# Patient Record
Sex: FEMALE | Race: WHITE | NOT HISPANIC OR LATINO | Employment: OTHER | URBAN - METROPOLITAN AREA
[De-identification: names, ages, dates, MRNs, and addresses within clinical notes are randomized per-mention and may not be internally consistent; named-entity substitution may affect disease eponyms.]

---

## 2022-10-13 ENCOUNTER — PATIENT OUTREACH (OUTPATIENT)
Dept: ADMINISTRATIVE | Facility: CLINIC | Age: 67
End: 2022-10-13

## 2022-10-13 NOTE — PROGRESS NOTES
C3 nurse attempted to contact Melissa Krishnamurthy for a TCC post hospital discharge follow up call. No answer. Left voicemail with callback information. The patient does not have a scheduled HOSFU appointment. Message sent to PCP staff for assistance with scheduling visit with patient.

## 2022-10-13 NOTE — PROGRESS NOTES
2nd Attempt made to reach patient for TCC call. Left voicemail please call [phone] leave first name, last name, and  for Naye I will return your call.

## 2022-10-18 NOTE — PROGRESS NOTES
C3 nurse spoke with Melissa Krishnamurthy  for a TCC post hospital discharge follow up call. The patient has completed a HOSFU appointment with Tonio Resendez MD  on 10/14/22 .

## 2022-10-18 NOTE — TELEPHONE ENCOUNTER
Eliquis 2.5 mg 1 tablet BID .  Amlodipine 200 mg once daily.  Metoprolol 25 mg tablet once daily.  Allopurinol 100 mg tablet once daily.  Bethanechol 25 mg 1 tablet BID.  Bumetanide 2 mg once daily  Duloxetine 60 mg capsule once daily.  Ferrous sulfate 322 mg tablet once daily.  K Dur 10 meq once daily.  Rosuvastatin 10 mg once daily.  Alendronate sodium 70 mg once a week.  Flonase 50 mcg nasal spray 1 spray each nostril route BID.  Spiriva Inhaler - Inhale 1 capsule once daily.  Symbicort 150/4.5 mg- Inhale 2 puffs BID

## 2024-01-01 ENCOUNTER — HOSPITAL ENCOUNTER (INPATIENT)
Facility: HOSPITAL | Age: 69
LOS: 3 days | Discharge: HOME HEALTH | DRG: 291 | End: 2024-01-05
Attending: EMERGENCY MEDICINE | Admitting: INTERNAL MEDICINE
Payer: MEDICARE

## 2024-01-01 DIAGNOSIS — R06.02 SHORTNESS OF BREATH: Primary | ICD-10-CM

## 2024-01-01 DIAGNOSIS — I48.91 ATRIAL FIBRILLATION WITH RVR: ICD-10-CM

## 2024-01-01 DIAGNOSIS — I50.9 CHF (CONGESTIVE HEART FAILURE): ICD-10-CM

## 2024-01-01 DIAGNOSIS — R79.89 ELEVATED TROPONIN: ICD-10-CM

## 2024-01-01 PROBLEM — J96.21 ACUTE ON CHRONIC HYPOXIC RESPIRATORY FAILURE: Status: ACTIVE | Noted: 2024-01-01

## 2024-01-01 PROBLEM — I34.1 MITRAL VALVE PROLAPSE: Status: ACTIVE | Noted: 2024-01-01

## 2024-01-01 PROBLEM — I50.33 ACUTE ON CHRONIC DIASTOLIC CONGESTIVE HEART FAILURE: Status: ACTIVE | Noted: 2024-01-01

## 2024-01-01 LAB
ALBUMIN SERPL BCP-MCNC: 3.2 G/DL (ref 3.5–5.2)
ALP SERPL-CCNC: 83 U/L (ref 55–135)
ALT SERPL W/O P-5'-P-CCNC: 10 U/L (ref 10–44)
ANION GAP SERPL CALC-SCNC: 13 MMOL/L (ref 8–16)
AST SERPL-CCNC: 17 U/L (ref 10–40)
BASOPHILS # BLD AUTO: 0.01 K/UL (ref 0–0.2)
BASOPHILS NFR BLD: 0.1 % (ref 0–1.9)
BILIRUB SERPL-MCNC: 0.8 MG/DL (ref 0.1–1)
BNP SERPL-MCNC: 441 PG/ML (ref 0–99)
BUN SERPL-MCNC: 20 MG/DL (ref 8–23)
CALCIUM SERPL-MCNC: 9.4 MG/DL (ref 8.7–10.5)
CHLORIDE SERPL-SCNC: 99 MMOL/L (ref 95–110)
CO2 SERPL-SCNC: 26 MMOL/L (ref 23–29)
CREAT SERPL-MCNC: 1.4 MG/DL (ref 0.5–1.4)
DIFFERENTIAL METHOD BLD: ABNORMAL
EOSINOPHIL # BLD AUTO: 0 K/UL (ref 0–0.5)
EOSINOPHIL NFR BLD: 0.4 % (ref 0–8)
ERYTHROCYTE [DISTWIDTH] IN BLOOD BY AUTOMATED COUNT: 16.8 % (ref 11.5–14.5)
EST. GFR  (NO RACE VARIABLE): 40 ML/MIN/1.73 M^2
ESTIMATED AVG GLUCOSE: 100 MG/DL (ref 68–131)
GLUCOSE SERPL-MCNC: 119 MG/DL (ref 70–110)
HBA1C MFR BLD: 5.1 % (ref 4–5.6)
HCT VFR BLD AUTO: 24.9 % (ref 37–48.5)
HGB BLD-MCNC: 8.1 G/DL (ref 12–16)
IMM GRANULOCYTES # BLD AUTO: 0.05 K/UL (ref 0–0.04)
IMM GRANULOCYTES NFR BLD AUTO: 0.5 % (ref 0–0.5)
INFLUENZA A, MOLECULAR: NEGATIVE
INFLUENZA B, MOLECULAR: NEGATIVE
LYMPHOCYTES # BLD AUTO: 0.5 K/UL (ref 1–4.8)
LYMPHOCYTES NFR BLD: 5.5 % (ref 18–48)
MAGNESIUM SERPL-MCNC: 2.2 MG/DL (ref 1.6–2.6)
MCH RBC QN AUTO: 28.1 PG (ref 27–31)
MCHC RBC AUTO-ENTMCNC: 32.5 G/DL (ref 32–36)
MCV RBC AUTO: 87 FL (ref 82–98)
MONOCYTES # BLD AUTO: 0.8 K/UL (ref 0.3–1)
MONOCYTES NFR BLD: 7.6 % (ref 4–15)
NEUTROPHILS # BLD AUTO: 8.5 K/UL (ref 1.8–7.7)
NEUTROPHILS NFR BLD: 85.9 % (ref 38–73)
NRBC BLD-RTO: 0 /100 WBC
PLATELET # BLD AUTO: 179 K/UL (ref 150–450)
PMV BLD AUTO: 10 FL (ref 9.2–12.9)
POTASSIUM SERPL-SCNC: 4.2 MMOL/L (ref 3.5–5.1)
PROT SERPL-MCNC: 6.6 G/DL (ref 6–8.4)
RBC # BLD AUTO: 2.88 M/UL (ref 4–5.4)
SARS-COV-2 RDRP RESP QL NAA+PROBE: NEGATIVE
SODIUM SERPL-SCNC: 138 MMOL/L (ref 136–145)
SPECIMEN SOURCE: NORMAL
T4 FREE SERPL-MCNC: 1.1 NG/DL (ref 0.71–1.51)
TROPONIN I SERPL DL<=0.01 NG/ML-MCNC: 0.03 NG/ML (ref 0–0.03)
TROPONIN I SERPL DL<=0.01 NG/ML-MCNC: 0.04 NG/ML (ref 0–0.03)
TROPONIN I SERPL DL<=0.01 NG/ML-MCNC: 0.04 NG/ML (ref 0–0.03)
TSH SERPL DL<=0.005 MIU/L-ACNC: 2.82 UIU/ML (ref 0.4–4)
WBC # BLD AUTO: 9.89 K/UL (ref 3.9–12.7)

## 2024-01-01 PROCEDURE — 84443 ASSAY THYROID STIM HORMONE: CPT | Performed by: INTERNAL MEDICINE

## 2024-01-01 PROCEDURE — 25000003 PHARM REV CODE 250: Performed by: INTERNAL MEDICINE

## 2024-01-01 PROCEDURE — 93010 ELECTROCARDIOGRAM REPORT: CPT | Mod: 76,,, | Performed by: INTERNAL MEDICINE

## 2024-01-01 PROCEDURE — 5A09457 ASSISTANCE WITH RESPIRATORY VENTILATION, 24-96 CONSECUTIVE HOURS, CONTINUOUS POSITIVE AIRWAY PRESSURE: ICD-10-PCS | Performed by: FAMILY MEDICINE

## 2024-01-01 PROCEDURE — 27000190 HC CPAP FULL FACE MASK W/VALVE

## 2024-01-01 PROCEDURE — 83036 HEMOGLOBIN GLYCOSYLATED A1C: CPT | Performed by: INTERNAL MEDICINE

## 2024-01-01 PROCEDURE — 93005 ELECTROCARDIOGRAM TRACING: CPT

## 2024-01-01 PROCEDURE — 83735 ASSAY OF MAGNESIUM: CPT | Performed by: INTERNAL MEDICINE

## 2024-01-01 PROCEDURE — 99900035 HC TECH TIME PER 15 MIN (STAT)

## 2024-01-01 PROCEDURE — 84484 ASSAY OF TROPONIN QUANT: CPT | Mod: 91 | Performed by: INTERNAL MEDICINE

## 2024-01-01 PROCEDURE — 27000221 HC OXYGEN, UP TO 24 HOURS

## 2024-01-01 PROCEDURE — 80053 COMPREHEN METABOLIC PANEL: CPT | Performed by: EMERGENCY MEDICINE

## 2024-01-01 PROCEDURE — G0378 HOSPITAL OBSERVATION PER HR: HCPCS

## 2024-01-01 PROCEDURE — 96374 THER/PROPH/DIAG INJ IV PUSH: CPT

## 2024-01-01 PROCEDURE — 63600175 PHARM REV CODE 636 W HCPCS: Performed by: INTERNAL MEDICINE

## 2024-01-01 PROCEDURE — 94660 CPAP INITIATION&MGMT: CPT

## 2024-01-01 PROCEDURE — 83880 ASSAY OF NATRIURETIC PEPTIDE: CPT | Performed by: EMERGENCY MEDICINE

## 2024-01-01 PROCEDURE — 96372 THER/PROPH/DIAG INJ SC/IM: CPT | Performed by: INTERNAL MEDICINE

## 2024-01-01 PROCEDURE — 85025 COMPLETE CBC W/AUTO DIFF WBC: CPT | Performed by: EMERGENCY MEDICINE

## 2024-01-01 PROCEDURE — 84439 ASSAY OF FREE THYROXINE: CPT | Performed by: INTERNAL MEDICINE

## 2024-01-01 PROCEDURE — 99291 CRITICAL CARE FIRST HOUR: CPT

## 2024-01-01 PROCEDURE — 63600175 PHARM REV CODE 636 W HCPCS: Performed by: EMERGENCY MEDICINE

## 2024-01-01 PROCEDURE — 93010 ELECTROCARDIOGRAM REPORT: CPT | Mod: ,,, | Performed by: INTERNAL MEDICINE

## 2024-01-01 PROCEDURE — 84484 ASSAY OF TROPONIN QUANT: CPT | Performed by: EMERGENCY MEDICINE

## 2024-01-01 PROCEDURE — 87502 INFLUENZA DNA AMP PROBE: CPT | Performed by: EMERGENCY MEDICINE

## 2024-01-01 PROCEDURE — 94761 N-INVAS EAR/PLS OXIMETRY MLT: CPT

## 2024-01-01 PROCEDURE — U0002 COVID-19 LAB TEST NON-CDC: HCPCS | Performed by: EMERGENCY MEDICINE

## 2024-01-01 RX ORDER — SENNOSIDES 8.6 MG/1
1 TABLET ORAL NIGHTLY
COMMUNITY
Start: 2023-11-23 | End: 2024-11-22

## 2024-01-01 RX ORDER — ALLOPURINOL 100 MG/1
100 TABLET ORAL DAILY
Status: DISCONTINUED | OUTPATIENT
Start: 2024-01-02 | End: 2024-01-05 | Stop reason: HOSPADM

## 2024-01-01 RX ORDER — AMLODIPINE BESYLATE 5 MG/1
5 TABLET ORAL 2 TIMES DAILY
Status: ON HOLD | COMMUNITY
Start: 2023-11-10 | End: 2024-01-05 | Stop reason: HOSPADM

## 2024-01-01 RX ORDER — ENOXAPARIN SODIUM 100 MG/ML
40 INJECTION SUBCUTANEOUS EVERY 24 HOURS
Status: DISCONTINUED | OUTPATIENT
Start: 2024-01-01 | End: 2024-01-02

## 2024-01-01 RX ORDER — FERROUS SULFATE 325(65) MG
325 TABLET ORAL 2 TIMES DAILY
COMMUNITY
Start: 2023-11-17

## 2024-01-01 RX ORDER — OMEPRAZOLE 20 MG/1
20 CAPSULE, DELAYED RELEASE ORAL 2 TIMES DAILY
COMMUNITY

## 2024-01-01 RX ORDER — POLYETHYLENE GLYCOL 3350 17 G/17G
17 POWDER, FOR SOLUTION ORAL DAILY
Status: DISCONTINUED | OUTPATIENT
Start: 2024-01-02 | End: 2024-01-05 | Stop reason: HOSPADM

## 2024-01-01 RX ORDER — ALLOPURINOL 100 MG/1
100 TABLET ORAL DAILY
COMMUNITY

## 2024-01-01 RX ORDER — LEVOTHYROXINE SODIUM 75 UG/1
75 TABLET ORAL EVERY MORNING
COMMUNITY
Start: 2023-11-23 | End: 2024-11-22

## 2024-01-01 RX ORDER — POLYETHYLENE GLYCOL 3350 17 G/17G
1 POWDER, FOR SOLUTION ORAL DAILY
COMMUNITY
Start: 2023-11-24 | End: 2024-11-23

## 2024-01-01 RX ORDER — FERROUS SULFATE, DRIED 160(50) MG
1 TABLET, EXTENDED RELEASE ORAL 2 TIMES DAILY WITH MEALS
Status: DISCONTINUED | OUTPATIENT
Start: 2024-01-01 | End: 2024-01-05 | Stop reason: HOSPADM

## 2024-01-01 RX ORDER — BUDESONIDE, GLYCOPYRROLATE, AND FORMOTEROL FUMARATE 160; 9; 4.8 UG/1; UG/1; UG/1
2 AEROSOL, METERED RESPIRATORY (INHALATION) 2 TIMES DAILY
COMMUNITY
Start: 2023-12-23

## 2024-01-01 RX ORDER — ACETAMINOPHEN 325 MG/1
650 TABLET ORAL EVERY 6 HOURS PRN
Status: DISCONTINUED | OUTPATIENT
Start: 2024-01-01 | End: 2024-01-05 | Stop reason: HOSPADM

## 2024-01-01 RX ORDER — CLONAZEPAM 0.5 MG/1
0.5 TABLET ORAL 2 TIMES DAILY
COMMUNITY
Start: 2023-10-26

## 2024-01-01 RX ORDER — LEVOFLOXACIN 500 MG/1
500 TABLET, FILM COATED ORAL
COMMUNITY
Start: 2023-10-18 | End: 2024-01-01

## 2024-01-01 RX ORDER — HYDRALAZINE HYDROCHLORIDE 20 MG/ML
10 INJECTION INTRAMUSCULAR; INTRAVENOUS EVERY 6 HOURS PRN
Status: DISCONTINUED | OUTPATIENT
Start: 2024-01-01 | End: 2024-01-05 | Stop reason: HOSPADM

## 2024-01-01 RX ORDER — BETHANECHOL CHLORIDE 25 MG/1
25 TABLET ORAL 2 TIMES DAILY
Status: DISCONTINUED | OUTPATIENT
Start: 2024-01-01 | End: 2024-01-05 | Stop reason: HOSPADM

## 2024-01-01 RX ORDER — PANTOPRAZOLE SODIUM 40 MG/1
40 TABLET, DELAYED RELEASE ORAL DAILY
Status: DISCONTINUED | OUTPATIENT
Start: 2024-01-02 | End: 2024-01-05 | Stop reason: HOSPADM

## 2024-01-01 RX ORDER — FLUTICASONE FUROATE AND VILANTEROL 100; 25 UG/1; UG/1
1 POWDER RESPIRATORY (INHALATION) DAILY
Status: DISCONTINUED | OUTPATIENT
Start: 2024-01-02 | End: 2024-01-05 | Stop reason: HOSPADM

## 2024-01-01 RX ORDER — METOPROLOL SUCCINATE 25 MG/1
25 TABLET, EXTENDED RELEASE ORAL DAILY
Status: DISCONTINUED | OUTPATIENT
Start: 2024-01-02 | End: 2024-01-02

## 2024-01-01 RX ORDER — HYDRALAZINE HYDROCHLORIDE 100 MG/1
100 TABLET, FILM COATED ORAL 3 TIMES DAILY
Status: ON HOLD | COMMUNITY
Start: 2023-11-23 | End: 2024-01-05

## 2024-01-01 RX ORDER — ROSUVASTATIN CALCIUM 10 MG/1
10 TABLET, COATED ORAL DAILY
COMMUNITY

## 2024-01-01 RX ORDER — LACTULOSE 10 G/15ML
20 SOLUTION ORAL EVERY 8 HOURS PRN
Status: DISCONTINUED | OUTPATIENT
Start: 2024-01-01 | End: 2024-01-05 | Stop reason: HOSPADM

## 2024-01-01 RX ORDER — DULOXETIN HYDROCHLORIDE 60 MG/1
60 CAPSULE, DELAYED RELEASE ORAL 2 TIMES DAILY
COMMUNITY
Start: 2023-11-27

## 2024-01-01 RX ORDER — METHYLPREDNISOLONE 4 MG/1
TABLET ORAL
COMMUNITY
Start: 2023-10-18 | End: 2024-01-01

## 2024-01-01 RX ORDER — FUROSEMIDE 10 MG/ML
20 INJECTION INTRAMUSCULAR; INTRAVENOUS
Status: DISCONTINUED | OUTPATIENT
Start: 2024-01-01 | End: 2024-01-01

## 2024-01-01 RX ORDER — SENNOSIDES 8.6 MG/1
1 TABLET ORAL NIGHTLY
Status: DISCONTINUED | OUTPATIENT
Start: 2024-01-01 | End: 2024-01-05 | Stop reason: HOSPADM

## 2024-01-01 RX ORDER — DULOXETIN HYDROCHLORIDE 30 MG/1
60 CAPSULE, DELAYED RELEASE ORAL 2 TIMES DAILY
Status: DISCONTINUED | OUTPATIENT
Start: 2024-01-01 | End: 2024-01-05 | Stop reason: HOSPADM

## 2024-01-01 RX ORDER — METOPROLOL SUCCINATE 25 MG/1
25 TABLET, EXTENDED RELEASE ORAL DAILY
Status: ON HOLD | COMMUNITY
Start: 2023-11-24 | End: 2024-01-05 | Stop reason: HOSPADM

## 2024-01-01 RX ORDER — LANOLIN ALCOHOL/MO/W.PET/CERES
1 CREAM (GRAM) TOPICAL DAILY
Status: DISCONTINUED | OUTPATIENT
Start: 2024-01-02 | End: 2024-01-05 | Stop reason: HOSPADM

## 2024-01-01 RX ORDER — FERROUS SULFATE, DRIED 160(50) MG
1 TABLET, EXTENDED RELEASE ORAL 2 TIMES DAILY WITH MEALS
COMMUNITY

## 2024-01-01 RX ORDER — BETHANECHOL CHLORIDE 25 MG/1
25 TABLET ORAL 2 TIMES DAILY
COMMUNITY

## 2024-01-01 RX ORDER — LIOTHYRONINE SODIUM 5 UG/1
5 TABLET ORAL EVERY MORNING
COMMUNITY
Start: 2023-11-23 | End: 2024-11-22

## 2024-01-01 RX ORDER — ATORVASTATIN CALCIUM 40 MG/1
40 TABLET, FILM COATED ORAL DAILY
Status: DISCONTINUED | OUTPATIENT
Start: 2024-01-02 | End: 2024-01-05 | Stop reason: HOSPADM

## 2024-01-01 RX ORDER — LEVOTHYROXINE SODIUM 75 UG/1
75 TABLET ORAL EVERY MORNING
Status: DISCONTINUED | OUTPATIENT
Start: 2024-01-02 | End: 2024-01-05 | Stop reason: HOSPADM

## 2024-01-01 RX ORDER — FUROSEMIDE 10 MG/ML
20 INJECTION INTRAMUSCULAR; INTRAVENOUS
Status: DISCONTINUED | OUTPATIENT
Start: 2024-01-02 | End: 2024-01-01

## 2024-01-01 RX ORDER — ONDANSETRON 2 MG/ML
4 INJECTION INTRAMUSCULAR; INTRAVENOUS EVERY 8 HOURS PRN
Status: DISCONTINUED | OUTPATIENT
Start: 2024-01-01 | End: 2024-01-05 | Stop reason: HOSPADM

## 2024-01-01 RX ORDER — OMEGA-3 FATTY ACIDS 1000 MG
2 CAPSULE ORAL DAILY
COMMUNITY

## 2024-01-01 RX ORDER — BUDESONIDE AND FORMOTEROL FUMARATE DIHYDRATE 160; 4.5 UG/1; UG/1
2 AEROSOL RESPIRATORY (INHALATION) 2 TIMES DAILY
COMMUNITY
Start: 2023-11-30

## 2024-01-01 RX ORDER — FUROSEMIDE 10 MG/ML
20 INJECTION INTRAMUSCULAR; INTRAVENOUS
Status: DISCONTINUED | OUTPATIENT
Start: 2024-01-02 | End: 2024-01-03

## 2024-01-01 RX ORDER — SODIUM CHLORIDE 0.9 % (FLUSH) 0.9 %
10 SYRINGE (ML) INJECTION
Status: DISCONTINUED | OUTPATIENT
Start: 2024-01-01 | End: 2024-01-05 | Stop reason: HOSPADM

## 2024-01-01 RX ORDER — FUROSEMIDE 10 MG/ML
80 INJECTION INTRAMUSCULAR; INTRAVENOUS
Status: COMPLETED | OUTPATIENT
Start: 2024-01-01 | End: 2024-01-01

## 2024-01-01 RX ORDER — POTASSIUM CHLORIDE 20 MEQ/1
20 TABLET, EXTENDED RELEASE ORAL DAILY
COMMUNITY
Start: 2023-11-03

## 2024-01-01 RX ORDER — ALBUTEROL SULFATE 90 UG/1
2 AEROSOL, METERED RESPIRATORY (INHALATION)
COMMUNITY
Start: 2023-10-05 | End: 2024-10-04

## 2024-01-01 RX ORDER — ALPRAZOLAM 0.25 MG/1
0.5 TABLET ORAL 2 TIMES DAILY PRN
Status: DISCONTINUED | OUTPATIENT
Start: 2024-01-01 | End: 2024-01-05 | Stop reason: HOSPADM

## 2024-01-01 RX ORDER — BUMETANIDE 2 MG/1
2 TABLET ORAL DAILY
Status: ON HOLD | COMMUNITY
Start: 2023-11-10 | End: 2024-01-05 | Stop reason: HOSPADM

## 2024-01-01 RX ADMIN — CALCIUM CARBONATE 500 MG (1,250 MG)-VITAMIN D3 200 UNIT TABLET 1 TABLET: at 05:01

## 2024-01-01 RX ADMIN — FUROSEMIDE 80 MG: 10 INJECTION, SOLUTION INTRAVENOUS at 11:01

## 2024-01-01 RX ADMIN — ENOXAPARIN SODIUM 40 MG: 40 INJECTION SUBCUTANEOUS at 05:01

## 2024-01-01 RX ADMIN — BETHANECHOL CHLORIDE 25 MG: 25 TABLET ORAL at 09:01

## 2024-01-01 RX ADMIN — DULOXETINE 60 MG: 30 CAPSULE, DELAYED RELEASE ORAL at 09:01

## 2024-01-01 RX ADMIN — SENNOSIDES 1 TABLET: 8.6 TABLET, FILM COATED ORAL at 09:01

## 2024-01-01 NOTE — SUBJECTIVE & OBJECTIVE
Past Medical History:   Diagnosis Date    COPD (chronic obstructive pulmonary disease)     Hyperlipidemia     Hypertension        No past surgical history on file.    Review of patient's allergies indicates:  No Known Allergies    No current facility-administered medications on file prior to encounter.     Current Outpatient Medications on File Prior to Encounter   Medication Sig    allopurinoL (ZYLOPRIM) 100 MG tablet Take 100 mg by mouth once daily.    amLODIPine (NORVASC) 5 MG tablet Take 5 mg by mouth 2 (two) times daily.    bethanechol (URECHOLINE) 25 MG Tab Take 25 mg by mouth 2 (two) times daily.    BREZTRI AEROSPHERE 160-9-4.8 mcg/actuation HFAA Inhale 2 puffs into the lungs 2 (two) times daily.    bumetanide (BUMEX) 2 MG tablet Take 2 mg by mouth once daily.    calcium-vitamin D3 (OS- + D3) 500 mg-5 mcg (200 unit) per tablet Take 1 tablet by mouth 2 (two) times daily with meals.    clonazePAM (KLONOPIN) 0.5 MG tablet Take 0.5 mg by mouth 2 (two) times daily.    DULoxetine (CYMBALTA) 60 MG capsule Take 60 mg by mouth 2 (two) times daily.    ferrous sulfate (FEOSOL) 325 mg (65 mg iron) Tab tablet Take 325 mg by mouth 2 (two) times daily.    hydrALAZINE (APRESOLINE) 100 MG tablet Take 100 mg by mouth 3 (three) times daily.    levothyroxine (SYNTHROID) 75 MCG tablet Take 75 mcg by mouth every morning.    liothyronine (CYTOMEL) 5 MCG Tab Take 5 mcg by mouth every morning.    metoprolol succinate (TOPROL-XL) 25 MG 24 hr tablet Take 25 mg by mouth once daily.    omega-3 fatty acids 1,000 mg Cap Take 2 capsules by mouth once daily.    omeprazole (PRILOSEC) 20 MG capsule Take 20 mg by mouth 2 (two) times daily.    polyethylene glycol (GLYCOLAX) 17 gram PwPk Take 1 packet by mouth once daily.    potassium chloride SA (K-DUR,KLOR-CON) 20 MEQ tablet Take 20 mEq by mouth once daily.    rosuvastatin (CRESTOR) 10 MG tablet Take 10 mg by mouth once daily.    senna (SENOKOT) 8.6 mg tablet Take 1 tablet by mouth every  evening.    SYMBICORT 160-4.5 mcg/actuation HFAA Inhale 2 puffs into the lungs 2 (two) times daily.    albuterol (PROVENTIL/VENTOLIN HFA) 90 mcg/actuation inhaler Inhale 2 puffs into the lungs every 2 (two) hours as needed for Wheezing or Shortness of Breath.    alprazolam (XANAX) 0.5 MG tablet Take 0.5 mg by mouth 4 (four) times daily as needed.    guaifenesin (HUMIBID E) 400 mg Tab Take 400 mg by mouth.    ipratropium-albuterol (COMBIVENT RESPIMAT)  mcg/actuation inhaler Inhale 1 puff into the lungs 4 (four) times daily. (Patient not taking: Reported on 1/1/2024)    tramadol (ULTRAM) 50 mg tablet Take 50 mg by mouth every 6 (six) hours as needed.    [DISCONTINUED] citalopram (CELEXA) 40 MG tablet Take 1 tablet (40 mg total) by mouth once daily.    [DISCONTINUED] hydrochlorothiazide (HYDRODIURIL) 12.5 MG Tab Take 1 tablet (12.5 mg total) by mouth once daily.    [DISCONTINUED] ipratropium (ATROVENT) 0.02 % nebulizer solution Take 2.5 mLs (0.5 mg total) by nebulization 4 (four) times daily.    [DISCONTINUED] levoFLOXacin (LEVAQUIN) 500 MG tablet Take 500 mg by mouth.    [DISCONTINUED] lisinopril (PRINIVIL,ZESTRIL) 20 MG tablet Take 1 tablet (20 mg total) by mouth once daily.    [DISCONTINUED] methylPREDNISolone (MEDROL DOSEPACK) 4 mg tablet TAKE 6 TABLETS ON DAY 1 AS DIRECTED ON PACKAGE AND DECREASE BY 1 TAB EACH DAY FOR A TOTAL OF 6 DAYS     Family History       Problem Relation (Age of Onset)    Cancer Father    Emphysema Sister    Heart disease Mother    Hypertension Mother          Tobacco Use    Smoking status: Never    Smokeless tobacco: Not on file   Substance and Sexual Activity    Alcohol use: No    Drug use: No    Sexual activity: Not on file     Review of Systems   Constitutional:  Positive for fatigue. Negative for activity change and fever.   Respiratory:  Positive for shortness of breath.    Cardiovascular:  Negative for chest pain and leg swelling.   Gastrointestinal:  Negative for abdominal  pain, nausea and vomiting.   Psychiatric/Behavioral:  The patient is nervous/anxious.    All other systems reviewed and are negative.    Objective:     Vital Signs (Most Recent):  Temp: 99.9 °F (37.7 °C) (01/01/24 1124)  Pulse: (!) 130 (01/01/24 1549)  Resp: 19 (01/01/24 1549)  BP: 136/82 (01/01/24 1503)  SpO2: (!) 94 % (01/01/24 1549) Vital Signs (24h Range):  Temp:  [99.9 °F (37.7 °C)] 99.9 °F (37.7 °C)  Pulse:  [] 130  Resp:  [19-38] 19  SpO2:  [94 %-100 %] 94 %  BP: (136-160)/(74-90) 136/82        There is no height or weight on file to calculate BMI.     Physical Exam  Constitutional:       General: She is not in acute distress.     Comments: Frail   HENT:      Head: Normocephalic and atraumatic.      Nose: Nose normal.   Eyes:      Pupils: Pupils are equal, round, and reactive to light.   Cardiovascular:      Rate and Rhythm: Regular rhythm. Tachycardia present.   Pulmonary:      Effort: Pulmonary effort is normal.      Breath sounds: No stridor. No wheezing or rhonchi.      Comments: Mild bibasilar crackles  Abdominal:      General: Bowel sounds are normal. There is no distension.      Tenderness: There is no abdominal tenderness.   Musculoskeletal:         General: No deformity.      Right lower leg: No edema.      Left lower leg: No edema.   Skin:     General: Skin is warm and dry.      Comments: Has a small bruise to right lower extremity shin region   Neurological:      General: No focal deficit present.      Mental Status: She is alert and oriented to person, place, and time. Mental status is at baseline.   Psychiatric:         Mood and Affect: Mood normal.              CRANIAL NERVES     CN III, IV, VI   Pupils are equal, round, and reactive to light.       Significant Labs: All pertinent labs within the past 24 hours have been reviewed.    Significant Imaging: I have reviewed all pertinent imaging results/findings within the past 24 hours.

## 2024-01-01 NOTE — ASSESSMENT & PLAN NOTE
-likely due to demand ischemia   - EKG= on 01/01/2024= pending   -serial cardiac enzymes on 01/01/2024= elevated troponin x1   -aspirin daily  -cardiology consulted= follow recommendations

## 2024-01-01 NOTE — ASSESSMENT & PLAN NOTE
-previous echo on 11/16/2023:  The echocardiographic study is compatible with normal left ventricular   size and normal systolic function.   Echo-Doppler Study: Aortic Outflow peak velocity of 1.7m/s.   There is no aortic stenosis. There is mild aortic regurgitation. EF = >55%   There is no evidence of mitral stenosis. There is mild tricuspid   regurgitation observed by color doppler or spectral analysis. E/A = 1.8.   There is mitral regurgitation of 4+ severity.   Mitral Valve Prolapse.   Pulmonary insufficiency (mild).   PAP= 23mmHg.    Aortic sclerosis without stenosis.   Mild Left Atrial Enlargement.   Concentric Left Ventricular hypertrophy.       Recent Labs   Lab 01/01/24  1109   *      -status post Lasix 80 mg IV once in ER on 01/01/2024   -strict in and outs/daily weights  - Lasix 20 mg IV b.I.d. on 01/02/2024   Patient with one or more new problems requiring additional work-up/treatment.

## 2024-01-01 NOTE — PHARMACY MED REC
"  [facility]           Pharmacy  Admission Medication History     The home medication history was taken by Danielle Lau.      Medication history obtained from Medications listed below were obtained from: Analytic software- [email or web address].Unable to assess patient. Verified per dr first profile    Based on information gathered for medication list, you may go to "Admission" then "Reconcile Home Medications" tabs to review and/or act upon those items.     The home medication list has been updated by the Pharmacy department.   Please read ALL comments highlighted in yellow.   Please address this information as you see fit.    Feel free to contact us if you have any questions or require assistance.    The medications listed below were removed from the home medication list.  Please reorder if appropriate:    Patient reports NOT TAKING the following medication(s):  Celexa 40mg  Hctz 12.5mg  Atrovent nebulizer sol  Lisinopril 20mg  Levaquin 500mg  Medrol dpk 4mg    No current facility-administered medications on file prior to encounter.     Current Outpatient Medications on File Prior to Encounter   Medication Sig Dispense Refill    allopurinoL (ZYLOPRIM) 100 MG tablet Take 100 mg by mouth once daily.      amLODIPine (NORVASC) 5 MG tablet Take 5 mg by mouth 2 (two) times daily.      bethanechol (URECHOLINE) 25 MG Tab Take 25 mg by mouth 2 (two) times daily.      BREZTRI AEROSPHERE 160-9-4.8 mcg/actuation HFAA Inhale 2 puffs into the lungs 2 (two) times daily.      bumetanide (BUMEX) 2 MG tablet Take 2 mg by mouth once daily.      calcium-vitamin D3 (OS- + D3) 500 mg-5 mcg (200 unit) per tablet Take 1 tablet by mouth 2 (two) times daily with meals.      clonazePAM (KLONOPIN) 0.5 MG tablet Take 0.5 mg by mouth 2 (two) times daily.      DULoxetine (CYMBALTA) 60 MG capsule Take 60 mg by mouth 2 (two) times daily.      ferrous sulfate (FEOSOL) 325 mg (65 mg iron) Tab tablet Take 325 mg by mouth 2 (two) " times daily.      hydrALAZINE (APRESOLINE) 100 MG tablet Take 100 mg by mouth 3 (three) times daily.      levothyroxine (SYNTHROID) 75 MCG tablet Take 75 mcg by mouth every morning.      liothyronine (CYTOMEL) 5 MCG Tab Take 5 mcg by mouth every morning.      metoprolol succinate (TOPROL-XL) 25 MG 24 hr tablet Take 25 mg by mouth once daily.      omega-3 fatty acids 1,000 mg Cap Take 2 capsules by mouth once daily.      omeprazole (PRILOSEC) 20 MG capsule Take 20 mg by mouth 2 (two) times daily.      polyethylene glycol (GLYCOLAX) 17 gram PwPk Take 1 packet by mouth once daily.      potassium chloride SA (K-DUR,KLOR-CON) 20 MEQ tablet Take 20 mEq by mouth once daily.      rosuvastatin (CRESTOR) 10 MG tablet Take 10 mg by mouth once daily.      senna (SENOKOT) 8.6 mg tablet Take 1 tablet by mouth every evening.      SYMBICORT 160-4.5 mcg/actuation HFAA Inhale 2 puffs into the lungs 2 (two) times daily.      albuterol (PROVENTIL/VENTOLIN HFA) 90 mcg/actuation inhaler Inhale 2 puffs into the lungs every 2 (two) hours as needed for Wheezing or Shortness of Breath.      alprazolam (XANAX) 0.5 MG tablet Take 0.5 mg by mouth 4 (four) times daily as needed.      guaifenesin (HUMIBID E) 400 mg Tab Take 400 mg by mouth.      ipratropium-albuterol (COMBIVENT RESPIMAT)  mcg/actuation inhaler Inhale 1 puff into the lungs 4 (four) times daily. (Patient not taking: Reported on 1/1/2024) 1 Package 5    tramadol (ULTRAM) 50 mg tablet Take 50 mg by mouth every 6 (six) hours as needed.         Please address this information as you see fit.  Feel free to contact us if you have any questions or require assistance.    Danielle Lau  [phone]                .

## 2024-01-01 NOTE — ASSESSMENT & PLAN NOTE
Chronic, controlled. Latest blood pressure and vitals reviewed-     Temp:  [99.9 °F (37.7 °C)]   Pulse:  []   Resp:  [19-38]   BP: (136-160)/(74-90)   SpO2:  [95 %-100 %] .   Home meds for hypertension were reviewed and noted below.   Hypertension Medications               amLODIPine (NORVASC) 5 MG tablet Take 5 mg by mouth 2 (two) times daily.    bumetanide (BUMEX) 2 MG tablet Take 2 mg by mouth once daily.    hydrALAZINE (APRESOLINE) 100 MG tablet Take 100 mg by mouth 3 (three) times daily.    metoprolol succinate (TOPROL-XL) 25 MG 24 hr tablet Take 25 mg by mouth once daily.             Adjust BP medications as needed    Will utilize p.r.n. blood pressure medication only if patient's blood pressure greater than 160/100 and she develops symptoms such as worsening chest pain or shortness of breath.

## 2024-01-01 NOTE — Clinical Note
Diagnosis: Shortness of breath [786.05.ICD-9-CM]   Future Attending Provider: FARRUKH KINNEY [[id number]]   Admitting Provider:: MICHAEL GUTIERREZ [[id number]]

## 2024-01-01 NOTE — ASSESSMENT & PLAN NOTE
-per echo on 11/16/2023 at [facility]:  The echocardiographic study is compatible with normal left ventricular   size and normal systolic function.   Echo-Doppler Study: Aortic Outflow peak velocity of 1.7m/s.   There is no aortic stenosis. There is mild aortic regurgitation. EF = >55%   There is no evidence of mitral stenosis. There is mild tricuspid   regurgitation observed by color doppler or spectral analysis. E/A = 1.8.   There is mitral regurgitation of 4+ severity.   Mitral Valve Prolapse.   Pulmonary insufficiency (mild).   PAP= 23mmHg.    Aortic sclerosis without stenosis.   Mild Left Atrial Enlargement.   Concentric Left Ventricular hypertrophy.      -defer to Cardiology

## 2024-01-01 NOTE — ASSESSMENT & PLAN NOTE
-O2 sats currently low to mid 90s on 3 L nasal cannula (this is her baseline)   - patient temporarily required BiPAP in ER on admission  -BiPAP p.r.n.   -incentive spirometry q.2 hours while awake  -Consider pulmonary consult if worsening seen

## 2024-01-01 NOTE — ED PROVIDER NOTES
Emergency Department Encounter  Provider Note  Encounter Date: 1/1/2024    Patient Name: Melissa Krishnamurthy  MRN: [id number]    History of Present Illness   HPI  History of Present Illness:    Chief Complaint:   Chief Complaint   Patient presents with    Respiratory Distress     Brought to ED form home for sob. Cpap en route. 1 in nitro paste and 1 SL nitro.        69f pw 3d of SOB. No chest pain. Thinks maybe fluid overloaded. EMS put on cpap, 1 in nitro paste, 1 SL nitro and pt feels a little better. On 3L home oxygen, on eliquis for paroxysmal afib, preserved EF (11/2023 echo 55-60% at [facility]), chronic bilateral pleural effusions.    The following PMH/PSH/SocHx/FamHx has been reviewed by myself:    Past Medical History:   Diagnosis Date    COPD (chronic obstructive pulmonary disease)     Hyperlipidemia     Hypertension      No past surgical history on file.  Social History     Tobacco Use    Smoking status: Never   Substance Use Topics    Alcohol use: No    Drug use: No     Family History   Problem Relation Age of Onset    Heart disease Mother     Hypertension Mother     Cancer Father     Emphysema Sister        Allergies reviewed:  Review of patient's allergies indicates:  No Known Allergies    Medications reviewed:  Medication List with Changes/Refills   Current Medications    ALBUTEROL (PROVENTIL) 2.5 MG /3 ML (0.083 %) NEBULIZER SOLUTION    Take 3 mLs (2.5 mg total) by nebulization every 6 (six) hours as needed for Wheezing or Shortness of Breath.    ALBUTEROL (PROVENTIL/VENTOLIN HFA) 90 MCG/ACTUATION INHALER    Inhale 2 puffs into the lungs every 2 (two) hours as needed for Wheezing or Shortness of Breath.    ALLOPURINOL (ZYLOPRIM) 100 MG TABLET    Take 100 mg by mouth once daily.    ALPRAZOLAM (XANAX) 0.5 MG TABLET    Take 0.5 mg by mouth 4 (four) times daily as needed.    AMLODIPINE (NORVASC) 5 MG TABLET    Take 5 mg by mouth 2 (two) times daily.    BETHANECHOL (URECHOLINE) 25 MG TAB    Take 25 mg by mouth 2 (two)  times daily.    BREZTRI AEROSPHERE 160-9-4.8 MCG/ACTUATION HFAA    Inhale 2 puffs into the lungs 2 (two) times daily.    BUMETANIDE (BUMEX) 2 MG TABLET    Take 2 mg by mouth once daily.    CALCIUM-VITAMIN D3 (OS- + D3) 500 MG-5 MCG (200 UNIT) PER TABLET    Take 1 tablet by mouth 2 (two) times daily with meals.    CLONAZEPAM (KLONOPIN) 0.5 MG TABLET    Take 0.5 mg by mouth 2 (two) times daily.    DULOXETINE (CYMBALTA) 60 MG CAPSULE    Take 60 mg by mouth 2 (two) times daily.    FERROUS SULFATE (FEOSOL) 325 MG (65 MG IRON) TAB TABLET    Take 325 mg by mouth 2 (two) times daily.    GUAIFENESIN (HUMIBID E) 400 MG TAB    Take 400 mg by mouth.    HYDRALAZINE (APRESOLINE) 100 MG TABLET    Take 100 mg by mouth 3 (three) times daily.    IPRATROPIUM-ALBUTEROL (COMBIVENT RESPIMAT)  MCG/ACTUATION INHALER    Inhale 1 puff into the lungs 4 (four) times daily.    LEVOTHYROXINE (SYNTHROID) 75 MCG TABLET    Take 75 mcg by mouth every morning.    LIOTHYRONINE (CYTOMEL) 5 MCG TAB    Take 5 mcg by mouth every morning.    METOPROLOL SUCCINATE (TOPROL-XL) 25 MG 24 HR TABLET    Take 25 mg by mouth once daily.    OMEGA-3 FATTY ACIDS 1,000 MG CAP    Take 2 capsules by mouth once daily.    OMEPRAZOLE (PRILOSEC) 20 MG CAPSULE    Take 20 mg by mouth 2 (two) times daily.    POLYETHYLENE GLYCOL (GLYCOLAX) 17 GRAM PWPK    Take 1 packet by mouth once daily.    POTASSIUM CHLORIDE SA (K-DUR,KLOR-CON) 20 MEQ TABLET    Take 20 mEq by mouth once daily.    ROSUVASTATIN (CRESTOR) 10 MG TABLET    Take 10 mg by mouth once daily.    SENNA (SENOKOT) 8.6 MG TABLET    Take 1 tablet by mouth every evening.    SYMBICORT 160-4.5 MCG/ACTUATION HFAA    Inhale 2 puffs into the lungs 2 (two) times daily.    TRAMADOL (ULTRAM) 50 MG TABLET    Take 50 mg by mouth every 6 (six) hours as needed.   Discontinued Medications    CITALOPRAM (CELEXA) 40 MG TABLET    Take 1 tablet (40 mg total) by mouth once daily.    HYDROCHLOROTHIAZIDE (HYDRODIURIL) 12.5 MG TAB     Take 1 tablet (12.5 mg total) by mouth once daily.    IPRATROPIUM (ATROVENT) 0.02 % NEBULIZER SOLUTION    Take 2.5 mLs (0.5 mg total) by nebulization 4 (four) times daily.    LEVOFLOXACIN (LEVAQUIN) 500 MG TABLET    Take 500 mg by mouth.    LISINOPRIL (PRINIVIL,ZESTRIL) 20 MG TABLET    Take 1 tablet (20 mg total) by mouth once daily.    METHYLPREDNISOLONE (MEDROL DOSEPACK) 4 MG TABLET    TAKE 6 TABLETS ON DAY 1 AS DIRECTED ON PACKAGE AND DECREASE BY 1 TAB EACH DAY FOR A TOTAL OF 6 DAYS         Physical Exam   Physical Exam    Initial Vitals   BP Pulse Resp Temp SpO2   01/01/24 1026 01/01/24 1026 01/01/24 1027 01/01/24 1124 01/01/24 1026   (!) 157/76 96 (!) 37 99.9 °F (37.7 °C) 100 %      MAP       --                  Triage vital signs reviewed.    Constitutional: thin, cachectic. Respiratory distress.   HENT: Normocephalic, atraumatic. Moist mucous membranes.  Eyes: No conjunctival injection.  Resp: tachypneic, decreased BS on R, crackles on L  Cardio: Regular rate and rhythm.  GI: Abdomen non-distended.   MSK: Extremities without any deformities noted. No lower extremity edema.  Skin: Warm and dry. No rashes or lesions noted.  Neuro: Awake and alert. Moves all extremities.    ED Course   Procedures    Medical Decision Making    History Acquisition     The history is provided by the patient.   Assessment requiring an independent historian and why historian was needed: EMS, pt on cpap with difficulty speaking/resp distress    Review of prior external/non ED notes: pt with hx CHF and COPD exacerbation req hospitalization     Differential Diagnoses   Based on available information and initial assessment, the working Differential Diagnosis includes, but is not limited to:  PE, MI/ACS, pneumothorax, pericardial effusion/tamonade, pneumonia, lung abscess, pericarditis/myocarditis, pleural effusion, lung mass, CHF exacerbation, asthma exacerbation, COPD exacerbation, aspirated/ingested foreign body, airway obstruction, CO  poisoning, anemia, metabolic derangement, allergy/atopy, influenza, viral URI, viral syndrome.  .    EKG   EKG ordered and independently reviewed by me:   Afib rate 95 no STEMI    Labs   Lab tests ordered and independently reviewed by me:    Labs Reviewed   CBC W/ AUTO DIFFERENTIAL - Abnormal; Notable for the following components:       Result Value    RBC 2.88 (*)     Hemoglobin 8.1 (*)     Hematocrit 24.9 (*)     RDW 16.8 (*)     Gran # (ANC) 8.5 (*)     Immature Grans (Abs) 0.05 (*)     Lymph # 0.5 (*)     Gran % 85.9 (*)     Lymph % 5.5 (*)     All other components within normal limits   COMPREHENSIVE METABOLIC PANEL - Abnormal; Notable for the following components:    Glucose 119 (*)     Albumin 3.2 (*)     eGFR 40 (*)     All other components within normal limits   TROPONIN I - Abnormal; Notable for the following components:    Troponin I 0.040 (*)     All other components within normal limits   B-TYPE NATRIURETIC PEPTIDE - Abnormal; Notable for the following components:     (*)     All other components within normal limits   INFLUENZA A & B BY MOLECULAR   SARS-COV-2 RNA AMPLIFICATION, QUAL       Imaging   Imaging ordered and independently reviewed by me:   Imaging Results              X-Ray Chest AP Portable (Final result)  Result time 01/01/24 11:18:33      Final result by Sylvie Bell MD (01/01/24 11:18:33)                   Impression:      Examination suggestive of a layering left pleural effusion.    The right mainstem bronchus is not well seen, this could be positional, it could be inferiorly displaced due to atelectasis.  Consider chest CT evaluation if clinically warranted.      Electronically signed by: Sylvie Bell MD  Date:    01/01/2024  Time:    11:18               Narrative:    EXAMINATION:  XR CHEST AP PORTABLE    CLINICAL HISTORY:  CHF;    TECHNIQUE:  Single frontal view of the chest was performed.    COMPARISON:  12/18/2023    FINDINGS:  The cardiomediastinal  silhouette is midline.    There is increased interstitial lung markings, both lung fields.    There is mild increased attenuation of the left hemithorax compared to the right hemithorax which could indicate a layering pleural effusion on the left.    The right mainstem bronchus is not well define, could be positional, could be inferiorly displaced due to atelectasis, no prior examination are well above for comparison..    No pneumothorax.    The osseous structures appear within normal limits for age.                                         Additional Consideration   Melissa Krishnamurthy  presents to the Emergency Department today with sob. Concern copd exac vs chf exac. Lasix, bipap, admit to ICU    Additional testing considered but not indicated during the course of this workup: further imaging and labwork, not indicated  Co-morbidities/chronic illness/exacerbation of chronic illness considered which impacted clinical decision making: chf, htn, copd  Procedures done in the ED or plan for the OR: No  Social determinants of care considered during development of treatment plan include: Decreased medical literacy  Discussion of management or test interpretation with external provider: Yes, describe: admitting hospitalist  DNR discussion: No    The patient's list of active medications and allergies as documented per RN staff has been reviewed.  Medications given in the ED and/or prescribed:   Medications   furosemide injection 80 mg (80 mg Intravenous Given 1/1/24 1124)             ED Course as of 01/01/24 1536   Mon Jan 01, 2024   1147 CBC auto differential(!)  Independently interpreted by me; unremarkable or consistent with the patient's baseline   [CS]   1147 Comprehensive metabolic panel(!)  Independently interpreted by me; unremarkable or consistent with the patient's baseline   [CS]   1148 Troponin I(!): 0.040  Elevated; denies chest pain, no stemi [CS]   1148 X-Ray Chest AP Portable  Pleural effusions, chronic, ptx  [CS]   1357 Signout given to [facility] [CS]   1405 Pt taken off bipap and placed on 3L NC without worsening respiratory effort [CS]      ED Course User Index  [CS] Galina Abbasi MD       Explanation of disposition: Admit  Critical Care:    I have personally provided 45 minutes of critical care time exclusive of time spent on separately billable procedures. Time includes review of laboratory data, radiology results, discussion with consultants, and monitoring for potential decompensation. Interventions were performed as documented above.      Clinical Impression:     1. Shortness of breath       ED Disposition Condition    Observation Stable               Galina Abbasi MD  01/01/24 9064

## 2024-01-01 NOTE — HPI
This is a 69-year-old  female who comes in with complaints of progressively worsening shortness in bed for the past 2 weeks.  Patient normally on 3 L nasal cannula at home for history of COPD.  She was initially placed on BiPAP in the ER and found to have crackles in lung.  BNP was slightly elevated.  Patient given Lasix 80 mg IV once with diuresis and improvement in respiratory status.  Patient weaned off of BiPAP and now currently on 3 L nasal cannula.  Patient denies chest pain, fever chills, nausea vomiting or abdominal pain, dysuria hematuria, blood in stools black stools, loss of consciousness or seizures.  Patient is awake alert and oriented x3 and in no acute distress with no focal neuro deficits.  States last bowel movement was 2 days ago which is normal for her.  Patient states she lives at home with  and 2 adopted children.  She states she feels much better since coming to the hospital.  She follows all commands.  She also states she took her Bumex earlier this morning as well.  Patient currently admitted to hospitalist services.

## 2024-01-01 NOTE — ED NOTES
Assumed care of pt from EMS   Pt on cpap  C/o SOB onset this am reports hx of COPD pt on home o2   Initial o2 sat 68%   Pt reports some relief in sx since placed on bipap   Respiratory at bedside   Dr. Abbasi at bedside

## 2024-01-02 PROBLEM — I48.91 ATRIAL FIBRILLATION WITH RVR: Status: ACTIVE | Noted: 2024-01-02

## 2024-01-02 PROBLEM — D64.9 ANEMIA: Status: ACTIVE | Noted: 2024-01-02

## 2024-01-02 LAB
ANION GAP SERPL CALC-SCNC: 13 MMOL/L (ref 8–16)
BASOPHILS # BLD AUTO: 0.02 K/UL (ref 0–0.2)
BASOPHILS NFR BLD: 0.4 % (ref 0–1.9)
BUN SERPL-MCNC: 16 MG/DL (ref 8–23)
CALCIUM SERPL-MCNC: 9.6 MG/DL (ref 8.7–10.5)
CHLORIDE SERPL-SCNC: 98 MMOL/L (ref 95–110)
CO2 SERPL-SCNC: 30 MMOL/L (ref 23–29)
CREAT SERPL-MCNC: 1.3 MG/DL (ref 0.5–1.4)
DIFFERENTIAL METHOD BLD: ABNORMAL
EOSINOPHIL # BLD AUTO: 0 K/UL (ref 0–0.5)
EOSINOPHIL NFR BLD: 0.7 % (ref 0–8)
ERYTHROCYTE [DISTWIDTH] IN BLOOD BY AUTOMATED COUNT: 16.5 % (ref 11.5–14.5)
EST. GFR  (NO RACE VARIABLE): 44 ML/MIN/1.73 M^2
GLUCOSE SERPL-MCNC: 88 MG/DL (ref 70–110)
HCT VFR BLD AUTO: 27.2 % (ref 37–48.5)
HGB BLD-MCNC: 8.7 G/DL (ref 12–16)
IMM GRANULOCYTES # BLD AUTO: 0.03 K/UL (ref 0–0.04)
IMM GRANULOCYTES NFR BLD AUTO: 0.5 % (ref 0–0.5)
LYMPHOCYTES # BLD AUTO: 0.8 K/UL (ref 1–4.8)
LYMPHOCYTES NFR BLD: 13.6 % (ref 18–48)
MCH RBC QN AUTO: 28.2 PG (ref 27–31)
MCHC RBC AUTO-ENTMCNC: 32 G/DL (ref 32–36)
MCV RBC AUTO: 88 FL (ref 82–98)
MONOCYTES # BLD AUTO: 0.5 K/UL (ref 0.3–1)
MONOCYTES NFR BLD: 8.3 % (ref 4–15)
NEUTROPHILS # BLD AUTO: 4.3 K/UL (ref 1.8–7.7)
NEUTROPHILS NFR BLD: 76.5 % (ref 38–73)
NRBC BLD-RTO: 0 /100 WBC
PHOSPHATE SERPL-MCNC: 3.9 MG/DL (ref 2.7–4.5)
PHOSPHATE SERPL-MCNC: 3.9 MG/DL (ref 2.7–4.5)
PLATELET # BLD AUTO: 178 K/UL (ref 150–450)
PMV BLD AUTO: 9.6 FL (ref 9.2–12.9)
POTASSIUM SERPL-SCNC: 3.7 MMOL/L (ref 3.5–5.1)
RBC # BLD AUTO: 3.08 M/UL (ref 4–5.4)
SODIUM SERPL-SCNC: 141 MMOL/L (ref 136–145)
TROPONIN I SERPL DL<=0.01 NG/ML-MCNC: 0.04 NG/ML (ref 0–0.03)
WBC # BLD AUTO: 5.57 K/UL (ref 3.9–12.7)

## 2024-01-02 PROCEDURE — 25000242 PHARM REV CODE 250 ALT 637 W/ HCPCS: Performed by: FAMILY MEDICINE

## 2024-01-02 PROCEDURE — 25000242 PHARM REV CODE 250 ALT 637 W/ HCPCS: Performed by: INTERNAL MEDICINE

## 2024-01-02 PROCEDURE — 85025 COMPLETE CBC W/AUTO DIFF WBC: CPT | Performed by: INTERNAL MEDICINE

## 2024-01-02 PROCEDURE — 63600175 PHARM REV CODE 636 W HCPCS: Performed by: INTERNAL MEDICINE

## 2024-01-02 PROCEDURE — 25000003 PHARM REV CODE 250: Performed by: INTERNAL MEDICINE

## 2024-01-02 PROCEDURE — 99900035 HC TECH TIME PER 15 MIN (STAT)

## 2024-01-02 PROCEDURE — 27000221 HC OXYGEN, UP TO 24 HOURS

## 2024-01-02 PROCEDURE — 25000003 PHARM REV CODE 250: Performed by: FAMILY MEDICINE

## 2024-01-02 PROCEDURE — 96375 TX/PRO/DX INJ NEW DRUG ADDON: CPT

## 2024-01-02 PROCEDURE — 94640 AIRWAY INHALATION TREATMENT: CPT

## 2024-01-02 PROCEDURE — 93005 ELECTROCARDIOGRAM TRACING: CPT

## 2024-01-02 PROCEDURE — 11000001 HC ACUTE MED/SURG PRIVATE ROOM

## 2024-01-02 PROCEDURE — 80048 BASIC METABOLIC PNL TOTAL CA: CPT | Performed by: INTERNAL MEDICINE

## 2024-01-02 PROCEDURE — 93010 ELECTROCARDIOGRAM REPORT: CPT | Mod: ,,, | Performed by: INTERNAL MEDICINE

## 2024-01-02 PROCEDURE — 84484 ASSAY OF TROPONIN QUANT: CPT | Performed by: INTERNAL MEDICINE

## 2024-01-02 PROCEDURE — 96376 TX/PRO/DX INJ SAME DRUG ADON: CPT

## 2024-01-02 PROCEDURE — 99223 1ST HOSP IP/OBS HIGH 75: CPT | Mod: ,,, | Performed by: NURSE PRACTITIONER

## 2024-01-02 PROCEDURE — 94761 N-INVAS EAR/PLS OXIMETRY MLT: CPT

## 2024-01-02 PROCEDURE — 99291 CRITICAL CARE FIRST HOUR: CPT | Mod: ,,, | Performed by: STUDENT IN AN ORGANIZED HEALTH CARE EDUCATION/TRAINING PROGRAM

## 2024-01-02 PROCEDURE — 84100 ASSAY OF PHOSPHORUS: CPT | Performed by: INTERNAL MEDICINE

## 2024-01-02 RX ORDER — METOPROLOL SUCCINATE 50 MG/1
50 TABLET, EXTENDED RELEASE ORAL DAILY
Status: DISCONTINUED | OUTPATIENT
Start: 2024-01-03 | End: 2024-01-05

## 2024-01-02 RX ORDER — METOPROLOL SUCCINATE 25 MG/1
25 TABLET, EXTENDED RELEASE ORAL DAILY
Status: DISCONTINUED | OUTPATIENT
Start: 2024-01-02 | End: 2024-01-02

## 2024-01-02 RX ORDER — METOPROLOL TARTRATE 1 MG/ML
10 INJECTION, SOLUTION INTRAVENOUS 4 TIMES DAILY PRN
Status: DISCONTINUED | OUTPATIENT
Start: 2024-01-02 | End: 2024-01-03

## 2024-01-02 RX ADMIN — ALLOPURINOL 100 MG: 100 TABLET ORAL at 08:01

## 2024-01-02 RX ADMIN — PANTOPRAZOLE SODIUM 40 MG: 40 TABLET, DELAYED RELEASE ORAL at 08:01

## 2024-01-02 RX ADMIN — METOPROLOL TARTRATE 10 MG: 1 INJECTION, SOLUTION INTRAVENOUS at 11:01

## 2024-01-02 RX ADMIN — FUROSEMIDE 20 MG: 10 INJECTION, SOLUTION INTRAMUSCULAR; INTRAVENOUS at 08:01

## 2024-01-02 RX ADMIN — APIXABAN 2.5 MG: 2.5 TABLET, FILM COATED ORAL at 09:01

## 2024-01-02 RX ADMIN — CALCIUM CARBONATE 500 MG (1,250 MG)-VITAMIN D3 200 UNIT TABLET 1 TABLET: at 05:01

## 2024-01-02 RX ADMIN — DULOXETINE 60 MG: 30 CAPSULE, DELAYED RELEASE ORAL at 08:01

## 2024-01-02 RX ADMIN — ATORVASTATIN CALCIUM 40 MG: 40 TABLET, FILM COATED ORAL at 08:01

## 2024-01-02 RX ADMIN — METOPROLOL SUCCINATE 25 MG: 25 TABLET, EXTENDED RELEASE ORAL at 06:01

## 2024-01-02 RX ADMIN — CALCIUM CARBONATE 500 MG (1,250 MG)-VITAMIN D3 200 UNIT TABLET 1 TABLET: at 08:01

## 2024-01-02 RX ADMIN — FERROUS SULFATE TAB 325 MG (65 MG ELEMENTAL FE) 1 EACH: 325 (65 FE) TAB at 08:01

## 2024-01-02 RX ADMIN — BETHANECHOL CHLORIDE 25 MG: 25 TABLET ORAL at 08:01

## 2024-01-02 RX ADMIN — POLYETHYLENE GLYCOL 3350 17 G: 17 POWDER, FOR SOLUTION ORAL at 08:01

## 2024-01-02 RX ADMIN — FLUTICASONE FUROATE AND VILANTEROL TRIFENATATE 1 PUFF: 100; 25 POWDER RESPIRATORY (INHALATION) at 08:01

## 2024-01-02 RX ADMIN — LEVOTHYROXINE SODIUM 75 MCG: 25 TABLET ORAL at 06:01

## 2024-01-02 RX ADMIN — SENNOSIDES 1 TABLET: 8.6 TABLET, FILM COATED ORAL at 09:01

## 2024-01-02 RX ADMIN — TIOTROPIUM BROMIDE INHALATION SPRAY 2 PUFF: 3.12 SPRAY, METERED RESPIRATORY (INHALATION) at 02:01

## 2024-01-02 NOTE — H&P
[facility] Emergency [facility] Medicine  History & Physical    Patient Name: Melissa Krishnamurthy  MRN: [id number]  Patient Class: IP- Inpatient  Admission Date: 1/1/2024  Attending Physician:Jacquelin Mix MD  Primary Care Provider: Tonio Resendez MD         Patient information was obtained from patient and ER records.     Subjective:     Principal Problem:Acute on chronic diastolic congestive heart failure    Chief Complaint:   Chief Complaint   Patient presents with    Respiratory Distress     Brought to ED form home for sob. Cpap en route. 1 in nitro paste and 1 SL nitro.         HPI:  This is a 69-year-old  female who comes in with complaints of progressively worsening shortness in bed for the past 2 weeks.  Patient normally on 3 L nasal cannula at home for history of COPD.  She was initially placed on BiPAP in the ER and found to have crackles in lung.  BNP was slightly elevated.  Patient given Lasix 80 mg IV once with diuresis and improvement in respiratory status.  Patient weaned off of BiPAP and now currently on 3 L nasal cannula.  Patient denies chest pain, fever chills, nausea vomiting or abdominal pain, dysuria hematuria, blood in stools black stools, loss of consciousness or seizures.  Patient is awake alert and oriented x3 and in no acute distress with no focal neuro deficits.  States last bowel movement was 2 days ago which is normal for her.  Patient states she lives at home with  and 2 adopted children.  She states she feels much better since coming to the hospital.  She follows all commands.  She also states she took her Bumex earlier this morning as well.  Patient currently admitted to hospitalist services.    Past Medical History:   Diagnosis Date    COPD (chronic obstructive pulmonary disease)     Hyperlipidemia     Hypertension        No past surgical history on file.    Review of patient's allergies indicates:  No Known Allergies    No current facility-administered medications on  file prior to encounter.     Current Outpatient Medications on File Prior to Encounter   Medication Sig    allopurinoL (ZYLOPRIM) 100 MG tablet Take 100 mg by mouth once daily.    amLODIPine (NORVASC) 5 MG tablet Take 5 mg by mouth 2 (two) times daily.    bethanechol (URECHOLINE) 25 MG Tab Take 25 mg by mouth 2 (two) times daily.    BREZTRI AEROSPHERE 160-9-4.8 mcg/actuation HFAA Inhale 2 puffs into the lungs 2 (two) times daily.    bumetanide (BUMEX) 2 MG tablet Take 2 mg by mouth once daily.    calcium-vitamin D3 (OS- + D3) 500 mg-5 mcg (200 unit) per tablet Take 1 tablet by mouth 2 (two) times daily with meals.    clonazePAM (KLONOPIN) 0.5 MG tablet Take 0.5 mg by mouth 2 (two) times daily.    DULoxetine (CYMBALTA) 60 MG capsule Take 60 mg by mouth 2 (two) times daily.    ferrous sulfate (FEOSOL) 325 mg (65 mg iron) Tab tablet Take 325 mg by mouth 2 (two) times daily.    hydrALAZINE (APRESOLINE) 100 MG tablet Take 100 mg by mouth 3 (three) times daily.    levothyroxine (SYNTHROID) 75 MCG tablet Take 75 mcg by mouth every morning.    liothyronine (CYTOMEL) 5 MCG Tab Take 5 mcg by mouth every morning.    metoprolol succinate (TOPROL-XL) 25 MG 24 hr tablet Take 25 mg by mouth once daily.    omega-3 fatty acids 1,000 mg Cap Take 2 capsules by mouth once daily.    omeprazole (PRILOSEC) 20 MG capsule Take 20 mg by mouth 2 (two) times daily.    polyethylene glycol (GLYCOLAX) 17 gram PwPk Take 1 packet by mouth once daily.    potassium chloride SA (K-DUR,KLOR-CON) 20 MEQ tablet Take 20 mEq by mouth once daily.    rosuvastatin (CRESTOR) 10 MG tablet Take 10 mg by mouth once daily.    senna (SENOKOT) 8.6 mg tablet Take 1 tablet by mouth every evening.    SYMBICORT 160-4.5 mcg/actuation HFAA Inhale 2 puffs into the lungs 2 (two) times daily.    albuterol (PROVENTIL/VENTOLIN HFA) 90 mcg/actuation inhaler Inhale 2 puffs into the lungs every 2 (two) hours as needed for Wheezing or Shortness of Breath.    alprazolam  (XANAX) 0.5 MG tablet Take 0.5 mg by mouth 4 (four) times daily as needed.    guaifenesin (HUMIBID E) 400 mg Tab Take 400 mg by mouth.    ipratropium-albuterol (COMBIVENT RESPIMAT)  mcg/actuation inhaler Inhale 1 puff into the lungs 4 (four) times daily. (Patient not taking: Reported on 1/1/2024)    tramadol (ULTRAM) 50 mg tablet Take 50 mg by mouth every 6 (six) hours as needed.    [DISCONTINUED] citalopram (CELEXA) 40 MG tablet Take 1 tablet (40 mg total) by mouth once daily.    [DISCONTINUED] hydrochlorothiazide (HYDRODIURIL) 12.5 MG Tab Take 1 tablet (12.5 mg total) by mouth once daily.    [DISCONTINUED] ipratropium (ATROVENT) 0.02 % nebulizer solution Take 2.5 mLs (0.5 mg total) by nebulization 4 (four) times daily.    [DISCONTINUED] levoFLOXacin (LEVAQUIN) 500 MG tablet Take 500 mg by mouth.    [DISCONTINUED] lisinopril (PRINIVIL,ZESTRIL) 20 MG tablet Take 1 tablet (20 mg total) by mouth once daily.    [DISCONTINUED] methylPREDNISolone (MEDROL DOSEPACK) 4 mg tablet TAKE 6 TABLETS ON DAY 1 AS DIRECTED ON PACKAGE AND DECREASE BY 1 TAB EACH DAY FOR A TOTAL OF 6 DAYS     Family History       Problem Relation (Age of Onset)    Cancer Father    Emphysema Sister    Heart disease Mother    Hypertension Mother          Tobacco Use    Smoking status: Never    Smokeless tobacco: Not on file   Substance and Sexual Activity    Alcohol use: No    Drug use: No    Sexual activity: Not on file     Review of Systems   Constitutional:  Positive for fatigue. Negative for activity change and fever.   Respiratory:  Positive for shortness of breath.    Cardiovascular:  Negative for chest pain and leg swelling.   Gastrointestinal:  Negative for abdominal pain, nausea and vomiting.   Psychiatric/Behavioral:  The patient is nervous/anxious.    All other systems reviewed and are negative.    Objective:     Vital Signs (Most Recent):  Temp: 99.9 °F (37.7 °C) (01/01/24 1124)  Pulse: (!) 130 (01/01/24 1549)  Resp: 19 (01/01/24  1549)  BP: 136/82 (01/01/24 1503)  SpO2: (!) 94 % (01/01/24 1549) Vital Signs (24h Range):  Temp:  [99.9 °F (37.7 °C)] 99.9 °F (37.7 °C)  Pulse:  [] 130  Resp:  [19-38] 19  SpO2:  [94 %-100 %] 94 %  BP: (136-160)/(74-90) 136/82        There is no height or weight on file to calculate BMI.     Physical Exam  Constitutional:       General: She is not in acute distress.     Comments: Frail   HENT:      Head: Normocephalic and atraumatic.      Nose: Nose normal.   Eyes:      Pupils: Pupils are equal, round, and reactive to light.   Cardiovascular:      Rate and Rhythm: Regular rhythm. Tachycardia present.   Pulmonary:      Effort: Pulmonary effort is normal.      Breath sounds: No stridor. No wheezing or rhonchi.      Comments: Mild bibasilar crackles  Abdominal:      General: Bowel sounds are normal. There is no distension.      Tenderness: There is no abdominal tenderness.   Musculoskeletal:         General: No deformity.      Right lower leg: No edema.      Left lower leg: No edema.   Skin:     General: Skin is warm and dry.      Comments: Has a small bruise to right lower extremity shin region   Neurological:      General: No focal deficit present.      Mental Status: She is alert and oriented to person, place, and time. Mental status is at baseline.   Psychiatric:         Mood and Affect: Mood normal.              CRANIAL NERVES     CN III, IV, VI   Pupils are equal, round, and reactive to light.       Significant Labs: All pertinent labs within the past 24 hours have been reviewed.    Significant Imaging: I have reviewed all pertinent imaging results/findings within the past 24 hours.  Assessment/Plan:     * Acute on chronic diastolic congestive heart failure   -previous echo on 11/16/2023:  The echocardiographic study is compatible with normal left ventricular   size and normal systolic function.   Echo-Doppler Study: Aortic Outflow peak velocity of 1.7m/s.   There is no aortic stenosis. There is mild  aortic regurgitation. EF = >55%   There is no evidence of mitral stenosis. There is mild tricuspid   regurgitation observed by color doppler or spectral analysis. E/A = 1.8.   There is mitral regurgitation of 4+ severity.   Mitral Valve Prolapse.   Pulmonary insufficiency (mild).   PAP= 23mmHg.    Aortic sclerosis without stenosis.   Mild Left Atrial Enlargement.   Concentric Left Ventricular hypertrophy.       Recent Labs   Lab 01/01/24  1109   *      -status post Lasix 80 mg IV once in ER on 01/01/2024   -strict in and outs/daily weights  - Lasix 20 mg IV b.I.d. on 01/02/2024    Elevated troponin   -likely due to demand ischemia   - EKG= on 01/01/2024= pending   -serial cardiac enzymes on 01/01/2024= elevated troponin x1   -aspirin daily  -cardiology consulted= follow recommendations      Acute on chronic hypoxic respiratory failure   -O2 sats currently low to mid 90s on 3 L nasal cannula (this is her baseline)   - patient temporarily required BiPAP in ER on admission  -BiPAP p.r.n.   -incentive spirometry q.2 hours while awake  -Consider pulmonary consult if worsening seen    Mitral valve prolapse   [place] [facility] [place]  The echocardiographic study is compatible with normal left ventricular   size and normal systolic function.   Echo-Doppler Study: Aortic Outflow peak velocity of 1.7m/s.   There is no aortic stenosis. There is mild aortic regurgitation. EF = >55%   There is no evidence of mitral stenosis. There is mild tricuspid   regurgitation observed by color doppler or spectral analysis. E/A = 1.8.   There is mitral regurgitation of 4+ severity.   Mitral Valve Prolapse.   Pulmonary insufficiency (mild).   PAP= 23mmHg.    Aortic sclerosis without stenosis.   Mild Left Atrial Enlargement.   Concentric Left Ventricular hypertrophy.      -defer to Cardiology    Anxiety   -resume home medications      Hypertension  Chronic, controlled. Latest blood pressure and vitals reviewed-     Temp:  [99.9  °F (37.7 °C)]   Pulse:  []   Resp:  [19-38]   BP: (136-160)/(74-90)   SpO2:  [95 %-100 %] .   Home meds for hypertension were reviewed and noted below.   Hypertension Medications               amLODIPine (NORVASC) 5 MG tablet Take 5 mg by mouth 2 (two) times daily.    bumetanide (BUMEX) 2 MG tablet Take 2 mg by mouth once daily.    hydrALAZINE (APRESOLINE) 100 MG tablet Take 100 mg by mouth 3 (three) times daily.    metoprolol succinate (TOPROL-XL) 25 MG 24 hr tablet Take 25 mg by mouth once daily.             Adjust BP medications as needed    Will utilize p.r.n. blood pressure medication only if patient's blood pressure greater than 160/100 and she develops symptoms such as worsening chest pain or shortness of breath.    COPD (chronic obstructive pulmonary disease)   - no exacerbation   -resume home meds   -nebs p.r.n.      VTE Risk Mitigation (From admission, onward)           Ordered     enoxaparin injection 40 mg  Daily         01/01/24 1609     IP VTE HIGH RISK PATIENT  Once         01/01/24 1609                                    Jacquelin Mix MD  Department of Hospital Medicine  [facility] - Emergency Dept

## 2024-01-02 NOTE — CONSULTS
[facility] - Emergency Dept  Cardiology  Consult Note    Patient Name: Melissa Krishnamurthy  MRN: [id number]  Admission Date: 1/1/2024  Hospital Length of Stay: 0 days  Code Status: Full Code   Attending Provider: Beatriz Resendez MD   Consulting Provider: PILO Garrett ANP  Primary Care Physician: Tonio Resendez MD  Principal Problem:Acute on chronic diastolic congestive heart failure    Patient information was obtained from patient, past medical records, and ER records.     Inpatient consult to [facility]  Consult performed by: Gunjan Brooks APRN, ANP  Consult ordered by: Beatriz Resendez MD  Reason for consult: SOB; afib with RVR        Subjective:     Chief Complaint:  SOB      HPI:   68yo female with COPD on home O2 3LPM, HTN, acute on chronic diastolic heart failure, acute on chronic hypoxic respiratory failure, MVP and elevated troponin who presented to the ER with complaints of SOB. She reports SOB that started a few days ago that progressively worsened. She reports SOB with exertion that progressively worsened on the day of presentation when she got up to walk to the bathroom and did not improve with rest. She denies any chest pain or orthopnea. She also complains of intermittent palpitations. CBC with H&H 8.7&27.2 BMP with BUN 16 creatinine 1.3  Troponin .[phone] EKG with afib with HR 120s    Past Medical History:   Diagnosis Date    COPD (chronic obstructive pulmonary disease)     Hyperlipidemia     Hypertension        No past surgical history on file.    Review of patient's allergies indicates:  No Known Allergies    No current facility-administered medications on file prior to encounter.     Current Outpatient Medications on File Prior to Encounter   Medication Sig    allopurinoL (ZYLOPRIM) 100 MG tablet Take 100 mg by mouth once daily.    amLODIPine (NORVASC) 5 MG tablet Take 5 mg by mouth 2 (two) times daily.    bethanechol (URECHOLINE) 25 MG Tab Take 25 mg by  mouth 2 (two) times daily.    BREZTRI AEROSPHERE 160-9-4.8 mcg/actuation HFAA Inhale 2 puffs into the lungs 2 (two) times daily.    bumetanide (BUMEX) 2 MG tablet Take 2 mg by mouth once daily.    calcium-vitamin D3 (OS- + D3) 500 mg-5 mcg (200 unit) per tablet Take 1 tablet by mouth 2 (two) times daily with meals.    clonazePAM (KLONOPIN) 0.5 MG tablet Take 0.5 mg by mouth 2 (two) times daily.    DULoxetine (CYMBALTA) 60 MG capsule Take 60 mg by mouth 2 (two) times daily.    ferrous sulfate (FEOSOL) 325 mg (65 mg iron) Tab tablet Take 325 mg by mouth 2 (two) times daily.    hydrALAZINE (APRESOLINE) 100 MG tablet Take 100 mg by mouth 3 (three) times daily.    levothyroxine (SYNTHROID) 75 MCG tablet Take 75 mcg by mouth every morning.    liothyronine (CYTOMEL) 5 MCG Tab Take 5 mcg by mouth every morning.    metoprolol succinate (TOPROL-XL) 25 MG 24 hr tablet Take 25 mg by mouth once daily.    omega-3 fatty acids 1,000 mg Cap Take 2 capsules by mouth once daily.    omeprazole (PRILOSEC) 20 MG capsule Take 20 mg by mouth 2 (two) times daily.    polyethylene glycol (GLYCOLAX) 17 gram PwPk Take 1 packet by mouth once daily.    potassium chloride SA (K-DUR,KLOR-CON) 20 MEQ tablet Take 20 mEq by mouth once daily.    rosuvastatin (CRESTOR) 10 MG tablet Take 10 mg by mouth once daily.    senna (SENOKOT) 8.6 mg tablet Take 1 tablet by mouth every evening.    SYMBICORT 160-4.5 mcg/actuation HFAA Inhale 2 puffs into the lungs 2 (two) times daily.    albuterol (PROVENTIL/VENTOLIN HFA) 90 mcg/actuation inhaler Inhale 2 puffs into the lungs every 2 (two) hours as needed for Wheezing or Shortness of Breath.    alprazolam (XANAX) 0.5 MG tablet Take 0.5 mg by mouth 4 (four) times daily as needed.    guaifenesin (HUMIBID E) 400 mg Tab Take 400 mg by mouth.    ipratropium-albuterol (COMBIVENT RESPIMAT)  mcg/actuation inhaler Inhale 1 puff into the lungs 4 (four) times daily. (Patient not taking: Reported on 1/1/2024)     tramadol (ULTRAM) 50 mg tablet Take 50 mg by mouth every 6 (six) hours as needed.     Family History       Problem Relation (Age of Onset)    Cancer Father    Emphysema Sister    Heart disease Mother    Hypertension Mother          Tobacco Use    Smoking status: Never    Smokeless tobacco: Not on file   Substance and Sexual Activity    Alcohol use: No    Drug use: No    Sexual activity: Not on file     Review of Systems   Constitutional: Negative for chills, decreased appetite, diaphoresis and fever.   Cardiovascular:  Positive for dyspnea on exertion and orthopnea. Negative for chest pain, claudication, cyanosis, irregular heartbeat, leg swelling, near-syncope, palpitations, paroxysmal nocturnal dyspnea and syncope.   Respiratory:  Negative for cough, hemoptysis, shortness of breath and wheezing.    Gastrointestinal:  Negative for bloating, abdominal pain, constipation, diarrhea, melena, nausea and vomiting.   Neurological:  Negative for dizziness and weakness.     Objective:     Vital Signs (Most Recent):  Temp: 99.7 °F (37.6 °C) (01/02/24 1103)  Pulse: 110 (01/02/24 1302)  Resp: (!) 22 (01/02/24 1302)  BP: 125/64 (01/02/24 1302)  SpO2: 96 % (01/02/24 1302) Vital Signs (24h Range):  Temp:  [97.7 °F (36.5 °C)-99.7 °F (37.6 °C)] 99.7 °F (37.6 °C)  Pulse:  [109-148] 110  Resp:  [19-40] 22  SpO2:  [91 %-100 %] 96 %  BP: (119-157)/(58-89) 125/64     Weight: 56.7 kg (125 lb)  Body mass index is 20.18 kg/m².    SpO2: 96 %         Intake/Output Summary (Last 24 hours) at 1/2/2024 1334  Last data filed at 1/2/2024 0649  Gross per 24 hour   Intake 600 ml   Output 1900 ml   Net -1300 ml       Lines/Drains/Airways       Drain  Duration             Female External Urinary Catheter w/ Suction 01/01/24 2000 <1 day              Peripheral Intravenous Line  Duration                  Peripheral IV - Single Lumen 01/01/24 1137 Anterior;Left;Proximal Forearm 1 day                     Physical Exam  Constitutional:       General: She  is not in acute distress.     Appearance: She is well-developed.   Cardiovascular:      Rate and Rhythm: Tachycardia present. Rhythm irregularly irregular.      Heart sounds: No murmur heard.     No gallop.   Pulmonary:      Effort: Pulmonary effort is normal. Tachypnea present. No respiratory distress.      Breath sounds: Normal breath sounds. No wheezing.   Abdominal:      General: Bowel sounds are normal. There is no distension.      Palpations: Abdomen is soft.      Tenderness: There is no abdominal tenderness.   Skin:     General: Skin is warm and dry.   Neurological:      Mental Status: She is alert and oriented to person, place, and time.          Significant Labs: BMP:   Recent Labs   Lab 01/01/24  1108 01/01/24  1713 01/02/24  0430   *  --  88     --  141   K 4.2  --  3.7   CL 99  --  98   CO2 26  --  30*   BUN 20  --  16   CREATININE 1.4  --  1.3   CALCIUM 9.4  --  9.6   MG  --  2.2  --    , CBC   Recent Labs   Lab 01/01/24  1108 01/02/24  0430   WBC 9.89 5.57   HGB 8.1* 8.7*   HCT 24.9* 27.2*    178   , and Troponin   Recent Labs   Lab 01/01/24  1713 01/01/24  2223 01/02/24  0430   TROPONINI 0.036* 0.026 0.035*       Significant Imaging: Echocardiogram: Transthoracic echo (TTE) complete ([facility] Only): No results found for this or any previous visit.  Assessment and Plan:     * Acute on chronic diastolic congestive heart failure  - echo with EF 55% 11/2023  - ; CXR reviewed with layering pleural effusion  - on IV lasix 20mg BID with approximately 700cc out overnight  - strict I&Os and daily weights  - monitor closely during diuresis     Anemia  - H&H 8.7&27.2 upon admission  - baseline Hgb 8-9; reports long history of anemia treated with iron infusions in the past  - monitor H&H while admitted     Atrial fibrillation with RVR  - presented with SOB with afib with RVR with HR up to 130s  - remains in afib with HR up to 130s non sustained  - on Toprol XL 25mg po daily as an  outpatient- continued while admitted  - hopeful HR will improve as volume status improves; continue to monitor on telemetry; goal HR less than 120bpm  - on Eliquis 2.5mg po BID as an outpatient; CHADSVAS score 3 (CHF, HTN, age); recommend continuation of AC for stroke prevention if H&H permits     Elevated troponin  - troponin elevated at .[id number]  - presented with SOB and no chest pain  - elevation more related to demand etiology and no concern for ACS currently  - EKG with no acute changes     Acute on chronic hypoxic respiratory failure  - history of COPD on home O2  - presented with SOB with concern for volume overload  - management as detailed previously     Hypertension  - SBP 130s-150s  - On Toprol XL, Hydralazine and Norvasc as an outpatient  - continued on Toprol XL only for now; goal BP less than 130/80  - BP not fully controlled; monitor BP for now; if remains above goal will resume Hydralazine and Norvasc         VTE Risk Mitigation (From admission, onward)           Ordered     enoxaparin injection 40 mg  Daily         01/01/24 1609     IP VTE HIGH RISK PATIENT  Once         01/01/24 1609                    Thank you for your consult. I will follow-up with patient. Please contact us if you have any additional questions.    PILO Garrett, ANP  Cardiology   [facility] - Emergency Dept

## 2024-01-02 NOTE — HOSPITAL COURSE
1/2: diuresed 700 cc overnight with sob improved. Cards consulted and will continue to monitor. troponin elevated at .[id number] , elevation more related to demand etiology and no concern for ACS per cards. currently Cont home eliquis  1/3: pt denies any sob or cp. Creat from 1.3 to 1.8. Cards holding lasix  1/4: creat now at 1.5. cards starting demadex  1/5: creat stable around 1.6. Remains in afib with HR up to 110s non sustained. On Demadex BID with 1.3L out overnight. Feeling better. On O2 at home rate    Cards consult:- echo with normal LVEF and severe MR  - followed by Dr. Liz at [facility]; patient reports known history and was evaluated by Dr. Liz and told only a candidate for medical management and not a candidate for Mitral clip- reviewed echo with Dr Hoffmann this AM and feel likelihood of invasive evaluation for MitralClip possible  - will continue Demadex BID   - discussed with patient likelihood of recurrent exacerbations despite compliance with medication regimen and dietary restrictions  - will refer to Dr Lora for evaluation of MR

## 2024-01-02 NOTE — NURSING
Assumed care of patient from AM RN, patient is AAOX4, 4LNC, vitals WNL, no c/o pain or discomfort, resting comfortably in bed, all safety precautions are in place, call bell and tray table are within reach of the patient and no additional questions or concerns from the patient at this time.

## 2024-01-02 NOTE — ASSESSMENT & PLAN NOTE
- SBP 130s-150s  - On Toprol XL, Hydralazine and Norvasc as an outpatient  - continued on Toprol XL only for now; goal BP less than 130/80  - BP not fully controlled; monitor BP for now; if remains above goal will resume Hydralazine and Norvasc

## 2024-01-02 NOTE — ED NOTES
Afib -135 noted on CM. PRN med given as ordered. Pt denies CP or SOB at this time. Denies needs. Call light within reach.

## 2024-01-02 NOTE — ASSESSMENT & PLAN NOTE
- echo with EF 55% 11/2023  - ; CXR reviewed with layering pleural effusion  - on IV lasix 20mg BID with approximately 700cc out overnight  - strict I&Os and daily weights  - monitor closely during diuresis

## 2024-01-02 NOTE — PROGRESS NOTES
[facility] Emergency Dept  [facility] Medicine  Progress Note    Patient Name: Melissa Krishnamurthy  MRN: [id number]  Patient Class: IP- Inpatient   Admission Date: 1/1/2024  Length of Stay: 0 days  Attending Physician: Beatriz Resendez MD  Primary Care Provider: Tonio Resendez MD        Subjective:     Principal Problem:Acute on chronic diastolic congestive heart failure        HPI:   This is a 69-year-old  female who comes in with complaints of progressively worsening shortness in bed for the past 2 weeks.  Patient normally on 3 L nasal cannula at home for history of COPD.  She was initially placed on BiPAP in the ER and found to have crackles in lung.  BNP was slightly elevated.  Patient given Lasix 80 mg IV once with diuresis and improvement in respiratory status.  Patient weaned off of BiPAP and now currently on 3 L nasal cannula.  Patient denies chest pain, fever chills, nausea vomiting or abdominal pain, dysuria hematuria, blood in stools black stools, loss of consciousness or seizures.  Patient is awake alert and oriented x3 and in no acute distress with no focal neuro deficits.  States last bowel movement was 2 days ago which is normal for her.  Patient states she lives at home with  and 2 adopted children.  She states she feels much better since coming to the hospital.  She follows all commands.  She also states she took her Bumex earlier this morning as well.  Patient currently admitted to hospitalist services.    Overview/Hospital Course:  1/2: diuresed 700 cc overnight with sob improved. Cards consulted and will continue to monitor. troponin elevated at .[id number] , elevation more related to demand etiology and no concern for ACS per cards. currently Cont home eliquis    Past Medical History:   Diagnosis Date    COPD (chronic obstructive pulmonary disease)     Hyperlipidemia     Hypertension        No past surgical history on file.    Review of patient's allergies indicates:  No Known  Allergies    No current facility-administered medications on file prior to encounter.     Current Outpatient Medications on File Prior to Encounter   Medication Sig    allopurinoL (ZYLOPRIM) 100 MG tablet Take 100 mg by mouth once daily.    amLODIPine (NORVASC) 5 MG tablet Take 5 mg by mouth 2 (two) times daily.    bethanechol (URECHOLINE) 25 MG Tab Take 25 mg by mouth 2 (two) times daily.    BREZTRI AEROSPHERE 160-9-4.8 mcg/actuation HFAA Inhale 2 puffs into the lungs 2 (two) times daily.    bumetanide (BUMEX) 2 MG tablet Take 2 mg by mouth once daily.    calcium-vitamin D3 (OS- + D3) 500 mg-5 mcg (200 unit) per tablet Take 1 tablet by mouth 2 (two) times daily with meals.    clonazePAM (KLONOPIN) 0.5 MG tablet Take 0.5 mg by mouth 2 (two) times daily.    DULoxetine (CYMBALTA) 60 MG capsule Take 60 mg by mouth 2 (two) times daily.    ferrous sulfate (FEOSOL) 325 mg (65 mg iron) Tab tablet Take 325 mg by mouth 2 (two) times daily.    hydrALAZINE (APRESOLINE) 100 MG tablet Take 100 mg by mouth 3 (three) times daily.    levothyroxine (SYNTHROID) 75 MCG tablet Take 75 mcg by mouth every morning.    liothyronine (CYTOMEL) 5 MCG Tab Take 5 mcg by mouth every morning.    metoprolol succinate (TOPROL-XL) 25 MG 24 hr tablet Take 25 mg by mouth once daily.    omega-3 fatty acids 1,000 mg Cap Take 2 capsules by mouth once daily.    omeprazole (PRILOSEC) 20 MG capsule Take 20 mg by mouth 2 (two) times daily.    polyethylene glycol (GLYCOLAX) 17 gram PwPk Take 1 packet by mouth once daily.    potassium chloride SA (K-DUR,KLOR-CON) 20 MEQ tablet Take 20 mEq by mouth once daily.    rosuvastatin (CRESTOR) 10 MG tablet Take 10 mg by mouth once daily.    senna (SENOKOT) 8.6 mg tablet Take 1 tablet by mouth every evening.    SYMBICORT 160-4.5 mcg/actuation HFAA Inhale 2 puffs into the lungs 2 (two) times daily.    albuterol (PROVENTIL/VENTOLIN HFA) 90 mcg/actuation inhaler Inhale 2 puffs into the lungs every 2 (two) hours as  needed for Wheezing or Shortness of Breath.    alprazolam (XANAX) 0.5 MG tablet Take 0.5 mg by mouth 4 (four) times daily as needed.    guaifenesin (HUMIBID E) 400 mg Tab Take 400 mg by mouth.    ipratropium-albuterol (COMBIVENT RESPIMAT)  mcg/actuation inhaler Inhale 1 puff into the lungs 4 (four) times daily. (Patient not taking: Reported on 1/1/2024)    tramadol (ULTRAM) 50 mg tablet Take 50 mg by mouth every 6 (six) hours as needed.    [DISCONTINUED] citalopram (CELEXA) 40 MG tablet Take 1 tablet (40 mg total) by mouth once daily.    [DISCONTINUED] hydrochlorothiazide (HYDRODIURIL) 12.5 MG Tab Take 1 tablet (12.5 mg total) by mouth once daily.    [DISCONTINUED] ipratropium (ATROVENT) 0.02 % nebulizer solution Take 2.5 mLs (0.5 mg total) by nebulization 4 (four) times daily.    [DISCONTINUED] levoFLOXacin (LEVAQUIN) 500 MG tablet Take 500 mg by mouth.    [DISCONTINUED] lisinopril (PRINIVIL,ZESTRIL) 20 MG tablet Take 1 tablet (20 mg total) by mouth once daily.    [DISCONTINUED] methylPREDNISolone (MEDROL DOSEPACK) 4 mg tablet TAKE 6 TABLETS ON DAY 1 AS DIRECTED ON PACKAGE AND DECREASE BY 1 TAB EACH DAY FOR A TOTAL OF 6 DAYS     Family History       Problem Relation (Age of Onset)    Cancer Father    Emphysema Sister    Heart disease Mother    Hypertension Mother          Tobacco Use    Smoking status: Never    Smokeless tobacco: Not on file   Substance and Sexual Activity    Alcohol use: No    Drug use: No    Sexual activity: Not on file     Review of Systems   Constitutional:  Positive for fatigue. Negative for activity change and fever.   Respiratory:  Positive for shortness of breath.    Cardiovascular:  Negative for chest pain and leg swelling.   Gastrointestinal:  Negative for abdominal pain, nausea and vomiting.   Psychiatric/Behavioral:  The patient is nervous/anxious.    All other systems reviewed and are negative.    Objective:     Vital Signs (Most Recent):  Temp: 99.9 °F (37.7 °C) (01/01/24  1124)  Pulse: (!) 130 (01/01/24 1549)  Resp: 19 (01/01/24 1549)  BP: 136/82 (01/01/24 1503)  SpO2: (!) 94 % (01/01/24 1549) Vital Signs (24h Range):  Temp:  [99.9 °F (37.7 °C)] 99.9 °F (37.7 °C)  Pulse:  [] 130  Resp:  [19-38] 19  SpO2:  [94 %-100 %] 94 %  BP: (136-160)/(74-90) 136/82        There is no height or weight on file to calculate BMI.     Physical Exam  Constitutional:       General: She is not in acute distress.     Comments: Frail   HENT:      Head: Normocephalic and atraumatic.      Nose: Nose normal.   Eyes:      Pupils: Pupils are equal, round, and reactive to light.   Cardiovascular:      Rate and Rhythm: Regular rhythm. Tachycardia present.   Pulmonary:      Effort: Pulmonary effort is normal.      Breath sounds: No stridor. No wheezing or rhonchi.      Comments: Mild bibasilar crackles  Abdominal:      General: Bowel sounds are normal. There is no distension.      Tenderness: There is no abdominal tenderness.   Musculoskeletal:         General: No deformity.      Right lower leg: No edema.      Left lower leg: No edema.   Skin:     General: Skin is warm and dry.      Comments: Has a small bruise to right lower extremity shin region   Neurological:      General: No focal deficit present.      Mental Status: She is alert and oriented to person, place, and time. Mental status is at baseline.   Psychiatric:         Mood and Affect: Mood normal.              CRANIAL NERVES     CN III, IV, VI   Pupils are equal, round, and reactive to light.       Significant Labs: All pertinent labs within the past 24 hours have been reviewed.    Significant Imaging: I have reviewed all pertinent imaging results/findings within the past 24 hours.    Assessment/Plan:      * Acute on chronic diastolic congestive heart failure   -previous echo on 11/16/2023:  The echocardiographic study is compatible with normal left ventricular   size and normal systolic function.   Echo-Doppler Study: Aortic Outflow peak velocity  of 1.7m/s.   There is no aortic stenosis. There is mild aortic regurgitation. EF = >55%   There is no evidence of mitral stenosis. There is mild tricuspid   regurgitation observed by color doppler or spectral analysis. E/A = 1.8.   There is mitral regurgitation of 4+ severity.   Mitral Valve Prolapse.   Pulmonary insufficiency (mild).   PAP= 23mmHg.    Aortic sclerosis without stenosis.   Mild Left Atrial Enlargement.   Concentric Left Ventricular hypertrophy.       Recent Labs   Lab 01/01/24  1109   *      -status post Lasix 80 mg IV once in ER on 01/01/2024   -strict in and outs/daily weights  - Lasix 20 mg IV b.I.d. on 01/02/2024    Atrial fibrillation with RVR  Patient with Persistent (7 days or more) atrial fibrillation which is controlled currently with Beta Blocker. Patient is currently in atrial fibrillation.[id number] Score: 2. Anticoagulation indicated. Anticoagulation done with eliquis .    Elevated troponin   -likely due to demand ischemia   - EKG= on 01/01/2024= pending   -serial cardiac enzymes on 01/01/2024= elevated troponin x1   -aspirin daily  -cardiology consulted= follow recommendations      Mitral valve prolapse   -per echo on 11/16/2023 at [facility]:  The echocardiographic study is compatible with normal left ventricular   size and normal systolic function.   Echo-Doppler Study: Aortic Outflow peak velocity of 1.7m/s.   There is no aortic stenosis. There is mild aortic regurgitation. EF = >55%   There is no evidence of mitral stenosis. There is mild tricuspid   regurgitation observed by color doppler or spectral analysis. E/A = 1.8.   There is mitral regurgitation of 4+ severity.   Mitral Valve Prolapse.   Pulmonary insufficiency (mild).   PAP= 23mmHg.    Aortic sclerosis without stenosis.   Mild Left Atrial Enlargement.   Concentric Left Ventricular hypertrophy.      -defer to Cardiology    Acute on chronic hypoxic respiratory failure   -O2 sats currently low to mid 90s on 3 L nasal cannula  (this is her baseline)   - patient temporarily required BiPAP in ER on admission  -BiPAP p.r.n.   -incentive spirometry q.2 hours while awake  -Consider pulmonary consult if worsening seen    Anxiety   -resume home medications      Hypertension  Chronic, controlled. Latest blood pressure and vitals reviewed-     Temp:  [99.9 °F (37.7 °C)]   Pulse:  []   Resp:  [19-38]   BP: (136-160)/(74-90)   SpO2:  [95 %-100 %] .   Home meds for hypertension were reviewed and noted below.   Hypertension Medications               amLODIPine (NORVASC) 5 MG tablet Take 5 mg by mouth 2 (two) times daily.    bumetanide (BUMEX) 2 MG tablet Take 2 mg by mouth once daily.    hydrALAZINE (APRESOLINE) 100 MG tablet Take 100 mg by mouth 3 (three) times daily.    metoprolol succinate (TOPROL-XL) 25 MG 24 hr tablet Take 25 mg by mouth once daily.             Adjust BP medications as needed    Will utilize p.r.n. blood pressure medication only if patient's blood pressure greater than 160/100 and she develops symptoms such as worsening chest pain or shortness of breath.    COPD (chronic obstructive pulmonary disease)   - no exacerbation   -resume home meds   -nebs p.r.n.      VTE Risk Mitigation (From admission, onward)           Ordered     apixaban tablet 2.5 mg  2 times daily         01/02/24 1624     IP VTE HIGH RISK PATIENT  Once         01/01/24 1609                    Discharge Planning   DARLIN:      Code Status: Full Code   Is the patient medically ready for discharge?:     Reason for patient still in hospital (select all that apply): Treatment  Discharge Plan A: Home Health, Home with family   Discharge Delays: None known at this time              Beatriz Resendez MD  Department of Hospital Medicine   [place] - Emergency Dept

## 2024-01-02 NOTE — NURSING
Contacted night doc about patient's increasing HR, patient sustaining in the 140's, waiting for orders, patient is asymptomatic, and running Afib on cardiac monitor.

## 2024-01-02 NOTE — ASSESSMENT & PLAN NOTE
- history of COPD on home O2  - presented with SOB with concern for volume overload  - management as detailed previously

## 2024-01-02 NOTE — SUBJECTIVE & OBJECTIVE
Past Medical History:   Diagnosis Date    COPD (chronic obstructive pulmonary disease)     Hyperlipidemia     Hypertension        No past surgical history on file.    Review of patient's allergies indicates:  No Known Allergies    No current facility-administered medications on file prior to encounter.     Current Outpatient Medications on File Prior to Encounter   Medication Sig    allopurinoL (ZYLOPRIM) 100 MG tablet Take 100 mg by mouth once daily.    amLODIPine (NORVASC) 5 MG tablet Take 5 mg by mouth 2 (two) times daily.    bethanechol (URECHOLINE) 25 MG Tab Take 25 mg by mouth 2 (two) times daily.    BREZTRI AEROSPHERE 160-9-4.8 mcg/actuation HFAA Inhale 2 puffs into the lungs 2 (two) times daily.    bumetanide (BUMEX) 2 MG tablet Take 2 mg by mouth once daily.    calcium-vitamin D3 (OS- + D3) 500 mg-5 mcg (200 unit) per tablet Take 1 tablet by mouth 2 (two) times daily with meals.    clonazePAM (KLONOPIN) 0.5 MG tablet Take 0.5 mg by mouth 2 (two) times daily.    DULoxetine (CYMBALTA) 60 MG capsule Take 60 mg by mouth 2 (two) times daily.    ferrous sulfate (FEOSOL) 325 mg (65 mg iron) Tab tablet Take 325 mg by mouth 2 (two) times daily.    hydrALAZINE (APRESOLINE) 100 MG tablet Take 100 mg by mouth 3 (three) times daily.    levothyroxine (SYNTHROID) 75 MCG tablet Take 75 mcg by mouth every morning.    liothyronine (CYTOMEL) 5 MCG Tab Take 5 mcg by mouth every morning.    metoprolol succinate (TOPROL-XL) 25 MG 24 hr tablet Take 25 mg by mouth once daily.    omega-3 fatty acids 1,000 mg Cap Take 2 capsules by mouth once daily.    omeprazole (PRILOSEC) 20 MG capsule Take 20 mg by mouth 2 (two) times daily.    polyethylene glycol (GLYCOLAX) 17 gram PwPk Take 1 packet by mouth once daily.    potassium chloride SA (K-DUR,KLOR-CON) 20 MEQ tablet Take 20 mEq by mouth once daily.    rosuvastatin (CRESTOR) 10 MG tablet Take 10 mg by mouth once daily.    senna (SENOKOT) 8.6 mg tablet Take 1 tablet by mouth every  evening.    SYMBICORT 160-4.5 mcg/actuation HFAA Inhale 2 puffs into the lungs 2 (two) times daily.    albuterol (PROVENTIL/VENTOLIN HFA) 90 mcg/actuation inhaler Inhale 2 puffs into the lungs every 2 (two) hours as needed for Wheezing or Shortness of Breath.    alprazolam (XANAX) 0.5 MG tablet Take 0.5 mg by mouth 4 (four) times daily as needed.    guaifenesin (HUMIBID E) 400 mg Tab Take 400 mg by mouth.    ipratropium-albuterol (COMBIVENT RESPIMAT)  mcg/actuation inhaler Inhale 1 puff into the lungs 4 (four) times daily. (Patient not taking: Reported on 1/1/2024)    tramadol (ULTRAM) 50 mg tablet Take 50 mg by mouth every 6 (six) hours as needed.     Family History       Problem Relation (Age of Onset)    Cancer Father    Emphysema Sister    Heart disease Mother    Hypertension Mother          Tobacco Use    Smoking status: Never    Smokeless tobacco: Not on file   Substance and Sexual Activity    Alcohol use: No    Drug use: No    Sexual activity: Not on file     Review of Systems   Constitutional: Negative for chills, decreased appetite, diaphoresis and fever.   Cardiovascular:  Positive for dyspnea on exertion and orthopnea. Negative for chest pain, claudication, cyanosis, irregular heartbeat, leg swelling, near-syncope, palpitations, paroxysmal nocturnal dyspnea and syncope.   Respiratory:  Negative for cough, hemoptysis, shortness of breath and wheezing.    Gastrointestinal:  Negative for bloating, abdominal pain, constipation, diarrhea, melena, nausea and vomiting.   Neurological:  Negative for dizziness and weakness.     Objective:     Vital Signs (Most Recent):  Temp: 99.7 °F (37.6 °C) (01/02/24 1103)  Pulse: 110 (01/02/24 1302)  Resp: (!) 22 (01/02/24 1302)  BP: 125/64 (01/02/24 1302)  SpO2: 96 % (01/02/24 1302) Vital Signs (24h Range):  Temp:  [97.7 °F (36.5 °C)-99.7 °F (37.6 °C)] 99.7 °F (37.6 °C)  Pulse:  [109-148] 110  Resp:  [19-40] 22  SpO2:  [91 %-100 %] 96 %  BP: (119-157)/(58-89) 125/64      Weight: 56.7 kg (125 lb)  Body mass index is 20.18 kg/m².    SpO2: 96 %         Intake/Output Summary (Last 24 hours) at 1/2/2024 1334  Last data filed at 1/2/2024 0649  Gross per 24 hour   Intake 600 ml   Output 1900 ml   Net -1300 ml       Lines/Drains/Airways       Drain  Duration             Female External Urinary Catheter w/ Suction 01/01/24 2000 <1 day              Peripheral Intravenous Line  Duration                  Peripheral IV - Single Lumen 01/01/24 1137 Anterior;Left;Proximal Forearm 1 day                     Physical Exam  Constitutional:       General: She is not in acute distress.     Appearance: She is well-developed.   Cardiovascular:      Rate and Rhythm: Tachycardia present. Rhythm irregularly irregular.      Heart sounds: No murmur heard.     No gallop.   Pulmonary:      Effort: Pulmonary effort is normal. Tachypnea present. No respiratory distress.      Breath sounds: Normal breath sounds. No wheezing.   Abdominal:      General: Bowel sounds are normal. There is no distension.      Palpations: Abdomen is soft.      Tenderness: There is no abdominal tenderness.   Skin:     General: Skin is warm and dry.   Neurological:      Mental Status: She is alert and oriented to person, place, and time.          Significant Labs: BMP:   Recent Labs   Lab 01/01/24  1108 01/01/24  1713 01/02/24  0430   *  --  88     --  141   K 4.2  --  3.7   CL 99  --  98   CO2 26  --  30*   BUN 20  --  16   CREATININE 1.4  --  1.3   CALCIUM 9.4  --  9.6   MG  --  2.2  --    , CBC   Recent Labs   Lab 01/01/24  1108 01/02/24  0430   WBC 9.89 5.57   HGB 8.1* 8.7*   HCT 24.9* 27.2*    178   , and Troponin   Recent Labs   Lab 01/01/24  1713 01/01/24  2223 01/02/24  0430   TROPONINI 0.036* 0.026 0.035*       Significant Imaging: Echocardiogram: Transthoracic echo (TTE) complete (Cupid Only): No results found for this or any previous visit.

## 2024-01-02 NOTE — HOSPITAL COURSE
1/2/2024 per HPI   1/3/2024 Remains in afib with HR up to 120s non sustained. Creatinine up to 1.8 from 1.3 yesterday. On Lasix 20mg BID with 750cc documented out overnight but likely more. Reports feeling better from SOB standpoint. Echo pending   1/4/2024 Creatinine trended down to 1.5. HR improving and currently 97 afib. HR with occasional runs of RVR with rates up to 100s-110s. SBP 100s-120s overnight. Echo with EF 69%, mild AI and severe MR.   1/5/2024 Creatinine 1.6 this AM. HR 80s-90s per Epic. Remains in afib with HR up to 110s non sustained. On Demadex BID with 1.3L out overnight. Feeling better. On O2 at home rate

## 2024-01-02 NOTE — CONSULTS
Consult received for CHF diet education. Pt remains in the ED at this time. Fluid and salt restricted diet handouts attached to d/c paperwork. RD to follow up as available.

## 2024-01-02 NOTE — ASSESSMENT & PLAN NOTE
- troponin elevated at .[phone]  - presented with SOB and no chest pain  - elevation more related to demand etiology and no concern for ACS currently  - EKG with no acute changes

## 2024-01-02 NOTE — ASSESSMENT & PLAN NOTE
Patient with Persistent (7 days or more) atrial fibrillation which is controlled currently with Beta Blocker. Patient is currently in atrial fibrillation.[id number] Score: 2. Anticoagulation indicated. Anticoagulation done with eliquis .

## 2024-01-02 NOTE — PLAN OF CARE
Of note, pt has home O2 and is on 3L at home. Pt acknowledged that her ride will need to bring her portable oxygen to the hospital to transport her home. Pt states she has [facility] services currently. SW sent updated info via [employer].       01/02/24 9446   Post-Acute Status   Post-Acute Authorization HME;Home Health   Hospital Resources/Appts/Education Provided Provided patient/caregiver with written discharge plan information   Discharge Delays None known at this time   Discharge Plan   Discharge Plan A Home Health;Home with family   Discharge Plan B Home with family

## 2024-01-02 NOTE — ASSESSMENT & PLAN NOTE
- H&H 8.7&27.2 upon admission  - baseline Hgb 8-9; reports long history of anemia treated with iron infusions in the past  - monitor H&H while admitted

## 2024-01-02 NOTE — PLAN OF CARE
[facility] - Emergency Dept  Initial Discharge Assessment       Primary Care Provider: Tonio Resendez MD    Admission Diagnosis: Shortness of breath [R06.02]    Admission Date: 1/1/2024  Expected Discharge Date:     Transition of Care Barriers: (P) None    Payor: [employer] MANAGED MEDICARE / Plan: [employer] MEDICARE HMO / Product Type: Capitation /     Extended Emergency Contact Information  Primary Emergency Contact: Kevin Krishnamurthy  Address: [address] [place] [address] [facility]  Home Phone: [phone]  Mobile Phone: [phone]  Relation: Spouse    Discharge Plan A: (P) Home Health, Home with family  Discharge Plan B: (P) Home with family      [id number] [place] - [address]  [address] [place] [address]  Phone: [phone] Fax: [phone]    [id number] - [place] - [address]  [address]  [place] [address]  Phone: [phone] Fax: [phone]      Initial Assessment (most recent)       Adult Discharge Assessment - 01/02/24 1409          Discharge Assessment    Assessment Type Discharge Planning Assessment (P)      Confirmed/corrected address, phone number and insurance Yes (P)      Source of Information patient (P)      Do you expect to return to your current living situation? Yes (P)      Do you have help at home or someone to help you manage your care at home? Yes (P)      Who are your caregiver(s) and their phone number(s)? Kevin Krishnamurthy (Spouse) [phone] (P)      Prior to hospitilization cognitive status: Alert/Oriented (P)      Current cognitive status: Alert/Oriented (P)      Walking or Climbing Stairs Difficulty no (P)      Dressing/Bathing Difficulty no (P)      Home Layout Able to live on 1st floor (P)      Equipment Currently Used at Home walker, rolling;wheelchair;shower chair (P)    as needed    Patient currently being followed by outpatient case management? Unable to determine (comments) (P)      Do you  currently have service(s) that help you manage your care at home? Yes (P)      Name and Contact number of agency [facility] (P)      Is the pt/caregiver preference to resume services with current agency Yes (P)      Do you take prescription medications? Yes (P)      Do you have prescription coverage? Yes (P)      Coverage [employer] (P)      Do you have any problems affording any of your prescribed medications? No (P)      Is the patient taking medications as prescribed? yes (P)      Who is going to help you get home at discharge? granddaughter or neighbor-  to arrange Kevin Krishnamurthy (Spouse) [phone] (P)      How do you get to doctors appointments? family or friend will provide (P)      Are you on dialysis? No (P)      Discharge Plan A Home Health;Home with family (P)      Discharge Plan B Home with family (P)      DME Needed Upon Discharge  none (P)      Discharge Plan discussed with: Patient (P)      Transition of Care Barriers None (P)         Social Connections    Are you , , , , never , or living with a partner?  (P)                         Future Appointments   Date Time Provider Department Center   1/8/2024 10:00 AM Ceci Castelan MD [facility] [place], [facility] [place]  ED Social Work  [phone]

## 2024-01-02 NOTE — ASSESSMENT & PLAN NOTE
- presented with SOB with afib with RVR with HR up to 130s  - remains in afib with HR up to 130s non sustained  - on Toprol XL 25mg po daily as an outpatient- continued while admitted  - hopeful HR will improve as volume status improves; continue to monitor on telemetry; goal HR less than 120bpm  - on Eliquis 2.5mg po BID as an outpatient; CHADSVAS score 3 (CHF, HTN, age); recommend continuation of AC for stroke prevention if H&H permits

## 2024-01-02 NOTE — H&P
[facility] Emergency Dept  [facility]  History & Physical    Patient Name: Melissa Krishnamurthy  MRN: [id number]  Patient Class: IP- Inpatient  Admission Date: 1/1/2024  Attending Physician:Jacquelin Mix MD  Primary Care Provider: Tonio Resendez MD         Patient information was obtained from patient and ER records.     Subjective:     Principal Problem:Acute on chronic diastolic congestive heart failure    Chief Complaint:   Chief Complaint   Patient presents with    Respiratory Distress     Brought to ED form home for sob. Cpap en route. 1 in nitro paste and 1 SL nitro.         HPI:  This is a 69-year-old  female who comes in with complaints of progressively worsening shortness in bed for the past 2 weeks.  Patient normally on 3 L nasal cannula at home for history of COPD.  She was initially placed on BiPAP in the ER and found to have crackles in lung.  BNP was slightly elevated.  Patient given Lasix 80 mg IV once with diuresis and improvement in respiratory status.  Patient weaned off of BiPAP and now currently on 3 L nasal cannula.  Patient denies chest pain, fever chills, nausea vomiting or abdominal pain, dysuria hematuria, blood in stools black stools, loss of consciousness or seizures.  Patient is awake alert and oriented x3 and in no acute distress with no focal neuro deficits.  States last bowel movement was 2 days ago which is normal for her.  Patient states she lives at home with  and 2 adopted children.  She states she feels much better since coming to the hospital.  She follows all commands.  She also states she took her Bumex earlier this morning as well.  Patient currently admitted to hospitalist services.    No new subjective & objective note has been filed under this hospital service since the last note was generated.  Assessment/Plan:     * Acute on chronic diastolic congestive heart failure   -previous echo on 11/16/2023:  The echocardiographic study is compatible with normal left  ventricular   size and normal systolic function.   Echo-Doppler Study: Aortic Outflow peak velocity of 1.7m/s.   There is no aortic stenosis. There is mild aortic regurgitation. EF = >55%   There is no evidence of mitral stenosis. There is mild tricuspid   regurgitation observed by color doppler or spectral analysis. E/A = 1.8.   There is mitral regurgitation of 4+ severity.   Mitral Valve Prolapse.   Pulmonary insufficiency (mild).   PAP= 23mmHg.    Aortic sclerosis without stenosis.   Mild Left Atrial Enlargement.   Concentric Left Ventricular hypertrophy.       Recent Labs   Lab 01/01/24  1109   *      -status post Lasix 80 mg IV once in ER on 01/01/2024   -strict in and outs/daily weights  - Lasix 20 mg IV b.I.d. on 01/02/2024    Elevated troponin   -likely due to demand ischemia   - EKG= on 01/01/2024= pending   -serial cardiac enzymes on 01/01/2024= elevated troponin x1   -aspirin daily  -cardiology consulted= follow recommendations      Acute on chronic hypoxic respiratory failure   -O2 sats currently low to mid 90s on 3 L nasal cannula (this is her baseline)   - patient temporarily required BiPAP in ER on admission  -BiPAP p.r.n.   -incentive spirometry q.2 hours while awake  -Consider pulmonary consult if worsening seen    Mitral valve prolapse   -per echo on 11/16/2023 at [facility] [place]:  The echocardiographic study is compatible with normal left ventricular   size and normal systolic function.   Echo-Doppler Study: Aortic Outflow peak velocity of 1.7m/s.   There is no aortic stenosis. There is mild aortic regurgitation. EF = >55%   There is no evidence of mitral stenosis. There is mild tricuspid   regurgitation observed by color doppler or spectral analysis. E/A = 1.8.   There is mitral regurgitation of 4+ severity.   Mitral Valve Prolapse.   Pulmonary insufficiency (mild).   PAP= 23mmHg.    Aortic sclerosis without stenosis.   Mild Left Atrial Enlargement.   Concentric Left Ventricular hypertrophy.       -defer to Cardiology    Anxiety   -resume home medications      Hypertension  Chronic, controlled. Latest blood pressure and vitals reviewed-     Temp:  [99.9 °F (37.7 °C)]   Pulse:  []   Resp:  [19-38]   BP: (136-160)/(74-90)   SpO2:  [95 %-100 %] .   Home meds for hypertension were reviewed and noted below.   Hypertension Medications               amLODIPine (NORVASC) 5 MG tablet Take 5 mg by mouth 2 (two) times daily.    bumetanide (BUMEX) 2 MG tablet Take 2 mg by mouth once daily.    hydrALAZINE (APRESOLINE) 100 MG tablet Take 100 mg by mouth 3 (three) times daily.    metoprolol succinate (TOPROL-XL) 25 MG 24 hr tablet Take 25 mg by mouth once daily.             Adjust BP medications as needed    Will utilize p.r.n. blood pressure medication only if patient's blood pressure greater than 160/100 and she develops symptoms such as worsening chest pain or shortness of breath.    COPD (chronic obstructive pulmonary disease)   - no exacerbation   -resume home meds   -nebs p.r.n.      VTE Risk Mitigation (From admission, onward)           Ordered     enoxaparin injection 40 mg  Daily         01/01/24 1609     IP VTE HIGH RISK PATIENT  Once         01/01/24 1609                                    Jacquelin Mix MD  Department of Hospital Medicine  [facility] Emergency Dept

## 2024-01-03 LAB
ANION GAP SERPL CALC-SCNC: 14 MMOL/L (ref 8–16)
ASCENDING AORTA: 3.21 CM
AV INDEX (PROSTH): 0.9
AV MEAN GRADIENT: 6 MMHG
AV PEAK GRADIENT: 9 MMHG
AV REGURGITATION PRESSURE HALF TIME: 532 MS
AV VALVE AREA BY VELOCITY RATIO: 2.59 CM²
AV VALVE AREA: 2.77 CM²
AV VELOCITY RATIO: 0.84
BSA FOR ECHO PROCEDURE: 1.52 M2
BUN SERPL-MCNC: 23 MG/DL (ref 8–23)
CALCIUM SERPL-MCNC: 9.6 MG/DL (ref 8.7–10.5)
CHLORIDE SERPL-SCNC: 95 MMOL/L (ref 95–110)
CO2 SERPL-SCNC: 30 MMOL/L (ref 23–29)
CREAT SERPL-MCNC: 1.8 MG/DL (ref 0.5–1.4)
CV ECHO LV RWT: 0.5 CM
DOP CALC AO PEAK VEL: 1.51 M/S
DOP CALC AO VTI: 27.1 CM
DOP CALC LVOT AREA: 3.1 CM2
DOP CALC LVOT DIAMETER: 1.98 CM
DOP CALC LVOT PEAK VEL: 1.27 M/S
DOP CALC LVOT STROKE VOLUME: 75.09 CM3
DOP CALC MV VTI: 17.5 CM
DOP CALCLVOT PEAK VEL VTI: 24.4 CM
E WAVE DECELERATION TIME: 66.35 MSEC
E/E' RATIO: 24.91 M/S
ECHO LV POSTERIOR WALL: 1.08 CM (ref 0.6–1.1)
EST. GFR  (NO RACE VARIABLE): 30 ML/MIN/1.73 M^2
FRACTIONAL SHORTENING: 26 % (ref 28–44)
GLUCOSE SERPL-MCNC: 125 MG/DL (ref 70–110)
INTERVENTRICULAR SEPTUM: 1.31 CM (ref 0.6–1.1)
IVC DIAMETER: 0.84 CM
LA MAJOR: 6.74 CM
LA WIDTH: 4.5 CM
LEFT ATRIUM SIZE: 4.37 CM
LEFT ATRIUM VOLUME INDEX MOD: 58.2 ML/M2
LEFT ATRIUM VOLUME MOD: 90.19 CM3
LEFT INTERNAL DIMENSION IN SYSTOLE: 3.23 CM (ref 2.1–4)
LEFT VENTRICLE DIASTOLIC VOLUME INDEX: 54.64 ML/M2
LEFT VENTRICLE DIASTOLIC VOLUME: 84.69 ML
LEFT VENTRICLE MASS INDEX: 120 G/M2
LEFT VENTRICLE SYSTOLIC VOLUME INDEX: 27 ML/M2
LEFT VENTRICLE SYSTOLIC VOLUME: 41.79 ML
LEFT VENTRICULAR INTERNAL DIMENSION IN DIASTOLE: 4.34 CM (ref 3.5–6)
LEFT VENTRICULAR MASS: 186.2 G
LV LATERAL E/E' RATIO: 22.83 M/S
LV SEPTAL E/E' RATIO: 27.4 M/S
LVOT MG: 3.59 MMHG
LVOT MV: 0.88 CM/S
MV MEAN GRADIENT: 6 MMHG
MV PEAK E VEL: 1.37 M/S
MV PEAK GRADIENT: 10 MMHG
MV STENOSIS PRESSURE HALF TIME: 19.24 MS
MV VALVE AREA BY CONTINUITY EQUATION: 4.29 CM2
MV VALVE AREA P 1/2 METHOD: 11.43 CM2
OHS LV EJECTION FRACTION SIMPSONS BIPLANE MOD: 69 %
PISA AR MAX VEL: 4.6 M/S
PISA MRMAX VEL: 5.57 M/S
PISA TR MAX VEL: 3.17 M/S
POTASSIUM SERPL-SCNC: 3.2 MMOL/L (ref 3.5–5.1)
PULM VEIN S/D RATIO: 0.76
PV MV: 0.46 M/S
PV PEAK D VEL: 0.54 M/S
PV PEAK GRADIENT: 2 MMHG
PV PEAK S VEL: 0.41 M/S
PV PEAK VELOCITY: 0.64 M/S
RA MAJOR: 4.99 CM
RA PRESSURE ESTIMATED: 3 MMHG
RA WIDTH: 3.22 CM
RIGHT VENTRICULAR END-DIASTOLIC DIMENSION: 2.1 CM
RV TB RVSP: 6 MMHG
RV TISSUE DOPPLER FREE WALL SYSTOLIC VELOCITY 1 (APICAL 4 CHAMBER VIEW): 10.92 CM/S
SINUS: 3.16 CM
SODIUM SERPL-SCNC: 139 MMOL/L (ref 136–145)
STJ: 3.33 CM
TDI LATERAL: 0.06 M/S
TDI SEPTAL: 0.05 M/S
TDI: 0.06 M/S
TR MAX PG: 40 MMHG
TRICUSPID ANNULAR PLANE SYSTOLIC EXCURSION: 1.75 CM
TV REST PULMONARY ARTERY PRESSURE: 43 MMHG
Z-SCORE OF LEFT VENTRICULAR DIMENSION IN END DIASTOLE: -0.33
Z-SCORE OF LEFT VENTRICULAR DIMENSION IN END SYSTOLE: 1.17

## 2024-01-03 PROCEDURE — 11000001 HC ACUTE MED/SURG PRIVATE ROOM

## 2024-01-03 PROCEDURE — 94761 N-INVAS EAR/PLS OXIMETRY MLT: CPT

## 2024-01-03 PROCEDURE — 94640 AIRWAY INHALATION TREATMENT: CPT

## 2024-01-03 PROCEDURE — 80048 BASIC METABOLIC PNL TOTAL CA: CPT | Performed by: INTERNAL MEDICINE

## 2024-01-03 PROCEDURE — 94660 CPAP INITIATION&MGMT: CPT

## 2024-01-03 PROCEDURE — 94799 UNLISTED PULMONARY SVC/PX: CPT | Mod: XB

## 2024-01-03 PROCEDURE — 25000242 PHARM REV CODE 250 ALT 637 W/ HCPCS: Performed by: FAMILY MEDICINE

## 2024-01-03 PROCEDURE — 99900035 HC TECH TIME PER 15 MIN (STAT)

## 2024-01-03 PROCEDURE — 99233 SBSQ HOSP IP/OBS HIGH 50: CPT | Mod: 25,,, | Performed by: NURSE PRACTITIONER

## 2024-01-03 PROCEDURE — 25000003 PHARM REV CODE 250: Performed by: INTERNAL MEDICINE

## 2024-01-03 PROCEDURE — 36415 COLL VENOUS BLD VENIPUNCTURE: CPT | Performed by: INTERNAL MEDICINE

## 2024-01-03 PROCEDURE — 25000003 PHARM REV CODE 250: Performed by: FAMILY MEDICINE

## 2024-01-03 PROCEDURE — 25000003 PHARM REV CODE 250: Performed by: NURSE PRACTITIONER

## 2024-01-03 PROCEDURE — 27000221 HC OXYGEN, UP TO 24 HOURS

## 2024-01-03 PROCEDURE — 25000242 PHARM REV CODE 250 ALT 637 W/ HCPCS: Performed by: INTERNAL MEDICINE

## 2024-01-03 RX ORDER — POTASSIUM CHLORIDE 20 MEQ/1
40 TABLET, EXTENDED RELEASE ORAL ONCE
Status: COMPLETED | OUTPATIENT
Start: 2024-01-03 | End: 2024-01-03

## 2024-01-03 RX ORDER — METOPROLOL TARTRATE 1 MG/ML
5 INJECTION, SOLUTION INTRAVENOUS 4 TIMES DAILY PRN
Status: DISCONTINUED | OUTPATIENT
Start: 2024-01-03 | End: 2024-01-05 | Stop reason: HOSPADM

## 2024-01-03 RX ORDER — METOPROLOL SUCCINATE 25 MG/1
25 TABLET, EXTENDED RELEASE ORAL
Status: DISCONTINUED | OUTPATIENT
Start: 2024-01-03 | End: 2024-01-05

## 2024-01-03 RX ORDER — METOPROLOL TARTRATE 1 MG/ML
5 INJECTION, SOLUTION INTRAVENOUS 4 TIMES DAILY PRN
Status: DISCONTINUED | OUTPATIENT
Start: 2024-01-03 | End: 2024-01-03

## 2024-01-03 RX ORDER — POTASSIUM CHLORIDE 20 MEQ/1
40 TABLET, EXTENDED RELEASE ORAL ONCE
Status: DISCONTINUED | OUTPATIENT
Start: 2024-01-03 | End: 2024-01-03

## 2024-01-03 RX ADMIN — SENNOSIDES 1 TABLET: 8.6 TABLET, FILM COATED ORAL at 09:01

## 2024-01-03 RX ADMIN — CALCIUM CARBONATE 500 MG (1,250 MG)-VITAMIN D3 200 UNIT TABLET 1 TABLET: at 09:01

## 2024-01-03 RX ADMIN — FERROUS SULFATE TAB 325 MG (65 MG ELEMENTAL FE) 1 EACH: 325 (65 FE) TAB at 09:01

## 2024-01-03 RX ADMIN — BETHANECHOL CHLORIDE 25 MG: 25 TABLET ORAL at 09:01

## 2024-01-03 RX ADMIN — METOPROLOL SUCCINATE 25 MG: 25 TABLET, EXTENDED RELEASE ORAL at 05:01

## 2024-01-03 RX ADMIN — ATORVASTATIN CALCIUM 40 MG: 40 TABLET, FILM COATED ORAL at 09:01

## 2024-01-03 RX ADMIN — METOPROLOL TARTRATE 10 MG: 1 INJECTION, SOLUTION INTRAVENOUS at 08:01

## 2024-01-03 RX ADMIN — DULOXETINE 60 MG: 30 CAPSULE, DELAYED RELEASE ORAL at 09:01

## 2024-01-03 RX ADMIN — TIOTROPIUM BROMIDE INHALATION SPRAY 2 PUFF: 3.12 SPRAY, METERED RESPIRATORY (INHALATION) at 10:01

## 2024-01-03 RX ADMIN — FLUTICASONE FUROATE AND VILANTEROL TRIFENATATE 1 PUFF: 100; 25 POWDER RESPIRATORY (INHALATION) at 10:01

## 2024-01-03 RX ADMIN — PANTOPRAZOLE SODIUM 40 MG: 40 TABLET, DELAYED RELEASE ORAL at 09:01

## 2024-01-03 RX ADMIN — APIXABAN 2.5 MG: 2.5 TABLET, FILM COATED ORAL at 09:01

## 2024-01-03 RX ADMIN — ALLOPURINOL 100 MG: 100 TABLET ORAL at 09:01

## 2024-01-03 RX ADMIN — METOPROLOL TARTRATE 10 MG: 1 INJECTION, SOLUTION INTRAVENOUS at 01:01

## 2024-01-03 RX ADMIN — METOPROLOL SUCCINATE 50 MG: 50 TABLET, EXTENDED RELEASE ORAL at 12:01

## 2024-01-03 RX ADMIN — METOPROLOL TARTRATE 5 MG: 1 INJECTION, SOLUTION INTRAVENOUS at 04:01

## 2024-01-03 RX ADMIN — CALCIUM CARBONATE 500 MG (1,250 MG)-VITAMIN D3 200 UNIT TABLET 1 TABLET: at 05:01

## 2024-01-03 RX ADMIN — POTASSIUM CHLORIDE 40 MEQ: 1500 TABLET, EXTENDED RELEASE ORAL at 12:01

## 2024-01-03 RX ADMIN — LEVOTHYROXINE SODIUM 75 MCG: 25 TABLET ORAL at 06:01

## 2024-01-03 NOTE — PLAN OF CARE
"   01/03/24 0959   Post-Acute Status   Post-Acute Authorization Home Health   Home Health Status Pending medical clearance/testing  (pending medical stability. F/U with cards requested. Pt will need [facility] orders to resume with [facility].)   Discharge Delays Orders Needed   Discharge Plan   Discharge Plan A Home;Home with family;Home Health     1040 am - Orders sent to [facility] via [facility]. No DC today.    Future Appointments   Date Time Provider Department Center   1/8/2024 10:00 AM Ceci Castelan MD [facility]     /61 (BP Location: Right arm, Patient Position: Lying)   Pulse 104   Temp 97.7 °F (36.5 °C) (Oral)   Resp 18   Ht 5' 6" (1.676 m)   Wt 49.9 kg (110 lb 0.2 oz)   SpO2 95%   BMI 17.76 kg/m²      allopurinoL  100 mg Oral Daily    apixaban  2.5 mg Oral BID    atorvastatin  40 mg Oral Daily    bethanechol  25 mg Oral BID    calcium-vitamin D3  1 tablet Oral BID WM    DULoxetine  60 mg Oral BID    ferrous sulfate  1 tablet Oral Daily    fluticasone furoate-vilanteroL  1 puff Inhalation Daily    levothyroxine  75 mcg Oral QAM    metoprolol succinate  25 mg Oral Daily before evening meal    metoprolol succinate  50 mg Oral Daily    pantoprazole  40 mg Oral Daily    polyethylene glycol  17 g Oral Daily    senna  1 tablet Oral QHS    tiotropium bromide  2 puff Inhalation Daily     Consults (From admission, onward)          Status Ordering Provider     [facility]  Once        Provider:  (Not yet assigned)    Completed DARREL KELLY     Inpatient consult to Social Work/Case Management  Once        Provider:  (Not yet assigned)    Completed TUAN MELISSA     Inpatient consult to Registered Dietitian/Nutritionist  Once        Provider:  (Not yet assigned)    Completed TUAN MLEISSA            "

## 2024-01-03 NOTE — NURSING
Received report from ED nurse, pt arrived on unit, VN notified, VS taken, pt on 4 L NC, fall bracelet and socks applied. Call bell within reach, pt instructed to call for assistance.

## 2024-01-03 NOTE — NURSING
Metoprolol 10mg IVP given per orders for -145. Patient was AAOx4 and asymptomatic and unaware of increased HR. VS recorded and monitored. Cardiology @ bedside @8:30 and is aware of med and rate. They will make adjustments on meds

## 2024-01-03 NOTE — SUBJECTIVE & OBJECTIVE
Review of Systems   Constitutional: Negative for chills, decreased appetite, diaphoresis, fever, malaise/fatigue, weight gain and weight loss.   Cardiovascular:  Positive for dyspnea on exertion (improving). Negative for chest pain, claudication, irregular heartbeat, leg swelling, near-syncope, orthopnea, palpitations and paroxysmal nocturnal dyspnea.   Respiratory:  Negative for cough, shortness of breath, snoring, sputum production and wheezing.    Endocrine: Negative for cold intolerance, heat intolerance, polydipsia, polyphagia and polyuria.   Skin:  Negative for color change, dry skin, itching, nail changes and poor wound healing.   Musculoskeletal:  Negative for back pain, gout, joint pain and joint swelling.   Gastrointestinal:  Negative for bloating, abdominal pain, constipation, diarrhea, hematemesis, hematochezia, melena, nausea and vomiting.   Genitourinary:  Negative for dysuria, hematuria and nocturia.   Neurological:  Negative for dizziness, headaches, light-headedness, numbness, paresthesias and weakness.   Psychiatric/Behavioral:  Negative for altered mental status, depression and memory loss.      Objective:     Vital Signs (Most Recent):  Temp: 97.7 °F (36.5 °C) (01/03/24 0831)  Pulse: 72 (01/03/24 1013)  Resp: 17 (01/03/24 1013)  BP: 104/61 (01/03/24 0831)  SpO2: (!) 93 % (01/03/24 1013) Vital Signs (24h Range):  Temp:  [97.7 °F (36.5 °C)-99.7 °F (37.6 °C)] 97.7 °F (36.5 °C)  Pulse:  [] 72  Resp:  [17-25] 17  SpO2:  [92 %-100 %] 93 %  BP: (103-139)/(61-88) 104/61     Weight: 49.9 kg (110 lb 0.2 oz)  Body mass index is 17.76 kg/m².     SpO2: (!) 93 %         Intake/Output Summary (Last 24 hours) at 1/3/2024 1053  Last data filed at 1/3/2024 0459  Gross per 24 hour   Intake --   Output 750 ml   Net -750 ml       Lines/Drains/Airways       Drain  Duration             Female External Urinary Catheter w/ Suction 01/01/24 2000 1 day              Peripheral Intravenous Line  Duration                   Peripheral IV - Single Lumen 01/01/24 1137 Anterior;Left;Proximal Forearm 1 day                       Physical Exam  Constitutional:       General: She is not in acute distress.     Appearance: She is well-developed.   Cardiovascular:      Rate and Rhythm: Tachycardia present. Rhythm irregularly irregular.      Heart sounds: No murmur heard.     No gallop.   Pulmonary:      Effort: Pulmonary effort is normal. No respiratory distress.      Breath sounds: Normal breath sounds. No wheezing.   Abdominal:      General: Bowel sounds are normal. There is no distension.      Palpations: Abdomen is soft.      Tenderness: There is no abdominal tenderness.   Skin:     General: Skin is warm and dry.   Neurological:      Mental Status: She is alert and oriented to person, place, and time.            Significant Labs: BMP:   Recent Labs   Lab 01/01/24  1108 01/01/24  1713 01/02/24  0430 01/03/24  0345   *  --  88 125*     --  141 139   K 4.2  --  3.7 3.2*   CL 99  --  98 95   CO2 26  --  30* 30*   BUN 20  --  16 23   CREATININE 1.4  --  1.3 1.8*   CALCIUM 9.4  --  9.6 9.6   MG  --  2.2  --   --    , CBC   Recent Labs   Lab 01/01/24  1108 01/02/24  0430   WBC 9.89 5.57   HGB 8.1* 8.7*   HCT 24.9* 27.2*    178   , and Troponin   Recent Labs   Lab 01/01/24  1713 01/01/24  2223 01/02/24  0430   TROPONINI 0.036* 0.026 0.035*       Significant Imaging: Echocardiogram: Transthoracic echo (TTE) complete (Cupid Only): No results found for this or any previous visit.

## 2024-01-03 NOTE — PLAN OF CARE
VIRTUAL NURSE:  Cued into patient's room.  Permission received per patient to turn camera to view patient.  Introduced as VN that will be working with floor nurse and nursing assistant.  Educated patient on VN's role in patient care and  [facility] model.  Plan of care reviewed with patient.  Education per flowsheet.   Informed patient that staff will round on them every 2 hours but to use call light for any other needs they may have; informed of fall risk and fall precautions.  Patient verbalized understanding.  Call light within reach; bed siderails up x3.  Opportunity given for questions and questions answered.  Admission assessment questions answered.  Patient denies complaints or any needs at this time. Instructed to call for assistance.  Will cont to monitor and intervene as needed.    Labs, notes, orders, and careplan reviewed.     01/02/24 1952   Admission   Initial VN Admission Questions Complete   Shift   Virtual Nurse - Rounding Complete   Virtual Nurse - Patient Verbalized Approval Of Camera Use;VN Rounding   Type of Frequent Check   Type Patient Rounds   Safety/Activity   Patient Rounds bed in low position;bed wheels locked;call light in patient/parent reach;clutter free environment maintained;ID band on;visualized patient;toileting offered;placement of personal items at bedside   Safety Promotion/Fall Prevention assistive device/personal item within reach   Safety Precautions emergency equipment at bedside   Activity Assistance Provided assistance, 1 person   Positioning   Body Position position changed independently

## 2024-01-03 NOTE — PLAN OF CARE
[facility]      HOME HEALTH ORDERS  FACE TO FACE ENCOUNTER    Patient Name: Melissa Krishnamurthy  YOB: 1955    PCP: Tonio Resendez MD   PCP Address: [address] [facility]*  PCP Phone Number: [phone]  PCP Fax: [phone]    Encounter Date: 1/1/24    Admit to Home Health    Diagnoses:  Active Hospital Problems    Diagnosis  POA    *Acute on chronic diastolic congestive heart failure [I50.33]  Yes    Atrial fibrillation with RVR [I48.91]  Yes    Anemia [D64.9]  Yes    Acute on chronic hypoxic respiratory failure [J96.21]  Yes    Mitral valve prolapse [I34.1]  Yes    Elevated troponin [R79.89]  Yes    COPD (chronic obstructive pulmonary disease) [J44.9]  Yes    Hypertension [I10]  Yes    Anxiety [F41.9]  Yes      Resolved Hospital Problems   No resolved problems to display.       Follow Up Appointments:  Future Appointments   Date Time Provider Department Center   1/8/2024 10:00 AM Ceci Castelan MD [facility]       Allergies:Review of patient's allergies indicates:  No Known Allergies    Medications: Review discharge medications with patient and family and provide education.    Current Facility-Administered Medications   Medication Dose Route Frequency Provider Last Rate Last Admin    acetaminophen tablet 650 mg  650 mg Oral Q6H PRN Jacquelin Mix MD        allopurinoL tablet 100 mg  100 mg Oral Daily Jacquelin Mix MD   100 mg at 01/03/24 0932    ALPRAZolam tablet 0.5 mg  0.5 mg Oral BID PRN Jacquelin Mix MD        apixaban tablet 2.5 mg  2.5 mg Oral BID Beatriz Resendez MD   2.5 mg at 01/03/24 0932    atorvastatin tablet 40 mg  40 mg Oral Daily Jacquelin Mix MD   40 mg at 01/03/24 0932    bethanechol tablet 25 mg  25 mg Oral BID Jacquelin Mix MD   25 mg at 01/03/24 0932    calcium-vitamin D3 500 mg-5 mcg (200 unit) per tablet 1 tablet  1 tablet Oral BID  Jacquelin Mix MD   1 tablet at 01/03/24 0932     DULoxetine DR capsule 60 mg  60 mg Oral BID Jacquelin Mix MD   60 mg at 01/03/24 0932    ferrous sulfate tablet 1 each  1 tablet Oral Daily Jacquelin Mix MD   1 each at 01/03/24 0932    fluticasone furoate-vilanteroL 100-25 mcg/dose diskus inhaler 1 puff  1 puff Inhalation Daily Jacquelin Mix MD   1 puff at 01/02/24 0819    hydrALAZINE injection 10 mg  10 mg Intravenous Q6H PRN Jacquelin Mix MD        lactulose 20 gram/30 mL solution Soln 20 g  20 g Oral Q8H PRN Jacquelin Mix MD        levothyroxine tablet 75 mcg  75 mcg Oral QAM Jacquelin Mix MD   75 mcg at 01/03/24 0601    metoprolol injection 5 mg  5 mg Intravenous QID PRN Gunjan Brooks APRN, ANP        metoprolol succinate (TOPROL-XL) 24 hr tablet 25 mg  25 mg Oral Daily before evening meal Gunjan Brooks APRN, ANP        metoprolol succinate (TOPROL-XL) 24 hr tablet 50 mg  50 mg Oral Daily Beatriz Resendez MD        ondansetron injection 4 mg  4 mg Intravenous Q8H PRN Jacquelin Mix MD        pantoprazole EC tablet 40 mg  40 mg Oral Daily Jacquelin Mix MD   40 mg at 01/03/24 0932    polyethylene glycol packet 17 g  17 g Oral Daily Jacquelin Mix MD   17 g at 01/02/24 0854    senna tablet 1 tablet  1 tablet Oral QHS Jacquelin Mix MD   1 tablet at 01/02/24 2100    sodium chloride 0.9% flush 10 mL  10 mL Intravenous PRN Jacquelin Mix MD        tiotropium bromide 2.5 mcg/actuation inhaler 2 puff  2 puff Inhalation Daily Beatriz Resendez MD   2 puff at 01/02/24 1447     Current Discharge Medication List        CONTINUE these medications which have NOT CHANGED    Details   allopurinoL (ZYLOPRIM) 100 MG tablet Take 100 mg by mouth once daily.      amLODIPine (NORVASC) 5 MG tablet Take 5 mg by mouth 2 (two) times daily.      bethanechol (URECHOLINE) 25 MG Tab Take 25 mg by mouth 2 (two) times daily.      BREZTRI AEROSPHERE 160-9-4.8 mcg/actuation HFAA Inhale 2 puffs into the lungs 2 (two) times daily.       bumetanide (BUMEX) 2 MG tablet Take 2 mg by mouth once daily.      calcium-vitamin D3 (OS- + D3) 500 mg-5 mcg (200 unit) per tablet Take 1 tablet by mouth 2 (two) times daily with meals.      clonazePAM (KLONOPIN) 0.5 MG tablet Take 0.5 mg by mouth 2 (two) times daily.      DULoxetine (CYMBALTA) 60 MG capsule Take 60 mg by mouth 2 (two) times daily.      ferrous sulfate (FEOSOL) 325 mg (65 mg iron) Tab tablet Take 325 mg by mouth 2 (two) times daily.      hydrALAZINE (APRESOLINE) 100 MG tablet Take 100 mg by mouth 3 (three) times daily.      levothyroxine (SYNTHROID) 75 MCG tablet Take 75 mcg by mouth every morning.      liothyronine (CYTOMEL) 5 MCG Tab Take 5 mcg by mouth every morning.      metoprolol succinate (TOPROL-XL) 25 MG 24 hr tablet Take 25 mg by mouth once daily.      omega-3 fatty acids 1,000 mg Cap Take 2 capsules by mouth once daily.      omeprazole (PRILOSEC) 20 MG capsule Take 20 mg by mouth 2 (two) times daily.      polyethylene glycol (GLYCOLAX) 17 gram PwPk Take 1 packet by mouth once daily.      potassium chloride SA (K-DUR,KLOR-CON) 20 MEQ tablet Take 20 mEq by mouth once daily.      rosuvastatin (CRESTOR) 10 MG tablet Take 10 mg by mouth once daily.      senna (SENOKOT) 8.6 mg tablet Take 1 tablet by mouth every evening.      SYMBICORT 160-4.5 mcg/actuation HFAA Inhale 2 puffs into the lungs 2 (two) times daily.      albuterol (PROVENTIL) 2.5 mg /3 mL (0.083 %) nebulizer solution Take 3 mLs (2.5 mg total) by nebulization every 6 (six) hours as needed for Wheezing or Shortness of Breath.  Qty: 3 Box, Refills: 5    Associated Diagnoses: COPD (chronic obstructive pulmonary disease)      albuterol (PROVENTIL/VENTOLIN HFA) 90 mcg/actuation inhaler Inhale 2 puffs into the lungs every 2 (two) hours as needed for Wheezing or Shortness of Breath.      alprazolam (XANAX) 0.5 MG tablet Take 0.5 mg by mouth 4 (four) times daily as needed.      guaifenesin (HUMIBID E) 400 mg Tab Take 400 mg by  mouth.      ipratropium-albuterol (COMBIVENT RESPIMAT)  mcg/actuation inhaler Inhale 1 puff into the lungs 4 (four) times daily.  Qty: 1 Package, Refills: 5    Associated Diagnoses: COPD (chronic obstructive pulmonary disease)      tramadol (ULTRAM) 50 mg tablet Take 50 mg by mouth every 6 (six) hours as needed.               I have seen and examined this patient within the last 30 days. My clinical findings that support the need for the home health skilled services and home bound status are the following:no   Weakness/numbness causing balance and gait disturbance due to Heart Failure making it taxing to leave home.     Diet:   cardiac diet    Labs:  N/a    Referrals/ Consults  Physical Therapy to evaluate and treat. Evaluate for home safety and equipment needs; Establish/upgrade home exercise program. Perform / instruct on therapeutic exercises, gait training, transfer training, and Range of Motion.  Occupational Therapy to evaluate and treat. Evaluate home environment for safety and equipment needs. Perform/Instruct on transfers, ADL training, ROM, and therapeutic exercises.   to evaluate for community resources/long-range planning.  Aide to provide assistance with personal care, ADLs, and vital signs.    Activities:   activity as tolerated    Nursing:   Agency to admit patient within 24 hours of hospital discharge unless specified on physician order or at patient request    SN to complete comprehensive assessment including routine vital signs. Instruct on disease process and s/s of complications to report to MD. Review/verify medication list sent home with the patient at time of discharge  and instruct patient/caregiver as needed. Frequency may be adjusted depending on start of care date.     Skilled nurse to perform up to 3 visits PRN for symptoms related to diagnosis    Notify MD if SBP > 160 or < 90; DBP > 90 or < 50; HR > 120 or < 50; Temp > 101; O2 < 88%; Other:       Ok to schedule  additional visits based on staff availability and patient request on consecutive days within the home health episode.    When multiple disciplines ordered:    Start of Care occurs on Sunday - Wednesday schedule remaining discipline evaluations as ordered on separate consecutive days following the start of care.    Thursday SOC -schedule subsequent evaluations Friday and Monday the following week.     Friday - Saturday SOC - schedule subsequent discipline evaluations on consecutive days starting Monday of the following week.    For all post-discharge communication and subsequent orders please contact patient's primary care physician. If unable to reach primary care physician or do not receive response within 30 minutes, please contact  for clinical staff order clarification    Miscellaneous   Heart Failure:      SN to instruct on the following:    Instruct on the definition of CHF.   Instruct on the signs/sympoms of CHF to be reported.   Instruct on and monitor daily weights.   Instruct on factors that cause exacerbation.   Instruct on action, dose, schedule, and side effects of medications.   Instruct on diet as prescribed.   Instruct on activity allowed.   Instruct on life-style modifications for life long management of CHF   SN to assess compliance with daily weights, diet, medications, fluid retention,    safety precautions, activities permitted and life-style modifications.   Additional 1-2 SN visits per week as needed for signs and symptoms     of CHF exacerbation.          Home Health Aide:  Nursing Three times weekly, Physical Therapy Three times weekly, Occupational Therapy Three times weekly, Medical Social Work Three times weekly, and Home Health Aide Three times weekly    Wound Care Orders  no    I certify that this patient is confined to her home and needs intermittent skilled nursing care, physical therapy, and occupational therapy.

## 2024-01-03 NOTE — ASSESSMENT & PLAN NOTE
- echo with EF 55% 11/2023; repeat echo pending today   - ; CXR reviewed with layering pleural effusion- on Lasix 20mg IV BID overnight 750cc documented out but likely inaccurate and suspect around 1.5-2.0  - SOB improved; will hold Lasix today since creatinine up to 1.8 from 1.3  - strict I&Os and daily weights  - monitor closely during diuresis

## 2024-01-03 NOTE — PROGRESS NOTES
Food & Nutrition  Education    Diet Education: CHF  Time Spent: 15 minutes  Learners: pt      Nutrition Education provided with handouts:   Heart Failure Nutrition Therapy    Comments:  Pt reports she follows a low sodium diet at home. She also checks her weight daily to monitor for fluid retention. Reviewed over foods high in sodium to avoid. Pt voiced understanding. Handouts were provided.     All questions and concerns answered. Dietitian's contact information provided.       Follow-Up: 1/10/24    Please Re-consult as needed        Thanks!

## 2024-01-03 NOTE — ASSESSMENT & PLAN NOTE
- troponin elevated at .[phone]  - presented with SOB and no chest pain  - elevation more related to demand etiology and no concern for ACS currently  - EKG with no acute changes; echo pending today

## 2024-01-03 NOTE — ASSESSMENT & PLAN NOTE
- presented with SOB with afib with RVR with HR up to 130s  - remains in afib with HR up to 110s-120s non sustained; given IV Metoprolol 10mg this AM due to concern for HR in the 120s  - on Toprol XL 25mg po daily as an outpatient and up titrated yesterday to 50mg  - will place on BID dosing today- 50mg in the morning and 25mg in the evening; overall goal is for rate control rather than rhythm control given COPD   - will change prn IV Metoprolol order to 5mg IV QID Prn for sustained HR greater than 140bpm for greater than 2-3 minutes   - continue Eliquis; CHADSVAS score 3 (CHF, HTN, age); recommend continuation of AC for stroke prevention if H&H permits

## 2024-01-03 NOTE — ASSESSMENT & PLAN NOTE
- SBP 110s-140s  - On Toprol XL, Hydralazine and Norvasc as an outpatient  - continued on Toprol XL only for now for afib management; BB up titrated for afib management; goal BP less than 130/80  - BP better controlled; monitor BP for now and continue to hold Norvasc and Hydralazine

## 2024-01-03 NOTE — PLAN OF CARE
Problem: Adult Inpatient Plan of Care  Goal: Plan of Care Review  Outcome: Ongoing, Progressing     Problem: Heart Failure Comorbidity  Goal: Maintenance of Heart Failure Symptom Control  Outcome: Ongoing, Progressing     Problem: Fall Injury Risk  Goal: Absence of Fall and Fall-Related Injury  Outcome: Ongoing, Progressing     Problem: Hypertension Comorbidity  Goal: Blood Pressure in Desired Range  Outcome: Ongoing, Progressing

## 2024-01-03 NOTE — SUBJECTIVE & OBJECTIVE
Interval History: denies cp or sob     Review of Systems   All other systems reviewed and are negative.    Objective:     Vital Signs (Most Recent):  Temp: 97.8 °F (36.6 °C) (01/03/24 1122)  Pulse: 108 (01/03/24 1230)  Resp: 18 (01/03/24 1122)  BP: (!) 144/68 (01/03/24 1625)  SpO2: 96 % (01/03/24 1122) Vital Signs (24h Range):  Temp:  [97.7 °F (36.5 °C)-98.8 °F (37.1 °C)] 97.8 °F (36.6 °C)  Pulse:  [] 108  Resp:  [17-24] 18  SpO2:  [92 %-100 %] 96 %  BP: (103-144)/(61-88) 144/68     Weight: 49.9 kg (110 lb)  Body mass index is 17.75 kg/m².    Intake/Output Summary (Last 24 hours) at 1/3/2024 1634  Last data filed at 1/3/2024 0459  Gross per 24 hour   Intake --   Output 250 ml   Net -250 ml         Physical Exam  Vitals and nursing note reviewed.   Constitutional:       Appearance: She is well-developed.   HENT:      Head: Normocephalic and atraumatic.      Nose: Nose normal.   Eyes:      Conjunctiva/sclera: Conjunctivae normal.   Cardiovascular:      Rate and Rhythm: Normal rate and regular rhythm.      Heart sounds: Normal heart sounds.   Pulmonary:      Effort: Pulmonary effort is normal.      Breath sounds: Normal breath sounds. No wheezing.   Abdominal:      General: Bowel sounds are normal.      Palpations: Abdomen is soft. There is no mass.      Tenderness: There is no abdominal tenderness. There is no guarding or rebound.   Musculoskeletal:         General: No tenderness. Normal range of motion.      Cervical back: Normal range of motion and neck supple.   Skin:     General: Skin is warm.      Findings: No rash.   Neurological:      Mental Status: She is alert and oriented to person, place, and time.      Cranial Nerves: No cranial nerve deficit.   Psychiatric:         Behavior: Behavior normal.         Thought Content: Thought content normal.             Significant Labs: All pertinent labs within the past 24 hours have been reviewed.  BMP:   Recent Labs   Lab 01/01/24  1713 01/02/24  0430  01/03/24  0345   GLU  --    < > 125*   NA  --    < > 139   K  --    < > 3.2*   CL  --    < > 95   CO2  --    < > 30*   BUN  --    < > 23   CREATININE  --    < > 1.8*   CALCIUM  --    < > 9.6   MG 2.2  --   --     < > = values in this interval not displayed.     CBC:   Recent Labs   Lab 01/02/24  0430   WBC 5.57   HGB 8.7*   HCT 27.2*          Significant Imaging: I have reviewed all pertinent imaging results/findings within the past 24 hours.  I have reviewed and interpreted all pertinent imaging results/findings within the past 24 hours.

## 2024-01-03 NOTE — PROGRESS NOTES
[facility] Medicine  Progress Note    Patient Name: Melissa Krishnamurthy  MRN: [id number]  Patient Class: IP- Inpatient   Admission Date: 1/1/2024  Length of Stay: 1 days  Attending Physician: Jacquelin Mix MD  Primary Care Provider: Tonio Resendez MD        Subjective:     Principal Problem:Acute on chronic diastolic congestive heart failure        HPI:   This is a 69-year-old  female who comes in with complaints of progressively worsening shortness in bed for the past 2 weeks.  Patient normally on 3 L nasal cannula at home for history of COPD.  She was initially placed on BiPAP in the ER and found to have crackles in lung.  BNP was slightly elevated.  Patient given Lasix 80 mg IV once with diuresis and improvement in respiratory status.  Patient weaned off of BiPAP and now currently on 3 L nasal cannula.  Patient denies chest pain, fever chills, nausea vomiting or abdominal pain, dysuria hematuria, blood in stools black stools, loss of consciousness or seizures.  Patient is awake alert and oriented x3 and in no acute distress with no focal neuro deficits.  States last bowel movement was 2 days ago which is normal for her.  Patient states she lives at home with  and 2 adopted children.  She states she feels much better since coming to the hospital.  She follows all commands.  She also states she took her Bumex earlier this morning as well.  Patient currently admitted to hospitalist services.    Overview/Hospital Course:  1/2: diuresed 700 cc overnight with sob improved. Cards consulted and will continue to monitor. troponin elevated at .[phone] , elevation more related to demand etiology and no concern for ACS per cards. currently Cont home eliquis  1/3: pt denies any sob or cp. Creat from 1.3 to 1.8. Cards holding lasix    Interval History: denies cp or sob     Review of Systems   All other systems reviewed and are negative.    Objective:     Vital Signs (Most  Recent):  Temp: 97.8 °F (36.6 °C) (01/03/24 1122)  Pulse: 108 (01/03/24 1230)  Resp: 18 (01/03/24 1122)  BP: (!) 144/68 (01/03/24 1625)  SpO2: 96 % (01/03/24 1122) Vital Signs (24h Range):  Temp:  [97.7 °F (36.5 °C)-98.8 °F (37.1 °C)] 97.8 °F (36.6 °C)  Pulse:  [] 108  Resp:  [17-24] 18  SpO2:  [92 %-100 %] 96 %  BP: (103-144)/(61-88) 144/68     Weight: 49.9 kg (110 lb)  Body mass index is 17.75 kg/m².    Intake/Output Summary (Last 24 hours) at 1/3/2024 1634  Last data filed at 1/3/2024 0459  Gross per 24 hour   Intake --   Output 250 ml   Net -250 ml         Physical Exam  Vitals and nursing note reviewed.   Constitutional:       Appearance: She is well-developed.   HENT:      Head: Normocephalic and atraumatic.      Nose: Nose normal.   Eyes:      Conjunctiva/sclera: Conjunctivae normal.   Cardiovascular:      Rate and Rhythm: Normal rate and regular rhythm.      Heart sounds: Normal heart sounds.   Pulmonary:      Effort: Pulmonary effort is normal.      Breath sounds: Normal breath sounds. No wheezing.   Abdominal:      General: Bowel sounds are normal.      Palpations: Abdomen is soft. There is no mass.      Tenderness: There is no abdominal tenderness. There is no guarding or rebound.   Musculoskeletal:         General: No tenderness. Normal range of motion.      Cervical back: Normal range of motion and neck supple.   Skin:     General: Skin is warm.      Findings: No rash.   Neurological:      Mental Status: She is alert and oriented to person, place, and time.      Cranial Nerves: No cranial nerve deficit.   Psychiatric:         Behavior: Behavior normal.         Thought Content: Thought content normal.             Significant Labs: All pertinent labs within the past 24 hours have been reviewed.  BMP:   Recent Labs   Lab 01/01/24  1713 01/02/24  0430 01/03/24  0345   GLU  --    < > 125*   NA  --    < > 139   K  --    < > 3.2*   CL  --    < > 95   CO2  --    < > 30*   BUN  --    < > 23   CREATININE   --    < > 1.8*   CALCIUM  --    < > 9.6   MG 2.2  --   --     < > = values in this interval not displayed.     CBC:   Recent Labs   Lab 01/02/24  0430   WBC 5.57   HGB 8.7*   HCT 27.2*          Significant Imaging: I have reviewed all pertinent imaging results/findings within the past 24 hours.  I have reviewed and interpreted all pertinent imaging results/findings within the past 24 hours.    Assessment/Plan:      * Acute on chronic diastolic congestive heart failure   -previous echo on 11/16/2023:  The echocardiographic study is compatible with normal left ventricular   size and normal systolic function.   Echo-Doppler Study: Aortic Outflow peak velocity of 1.7m/s.   There is no aortic stenosis. There is mild aortic regurgitation. EF = >55%   There is no evidence of mitral stenosis. There is mild tricuspid   regurgitation observed by color doppler or spectral analysis. E/A = 1.8.   There is mitral regurgitation of 4+ severity.   Mitral Valve Prolapse.   Pulmonary insufficiency (mild).   PAP= 23mmHg.    Aortic sclerosis without stenosis.   Mild Left Atrial Enlargement.   Concentric Left Ventricular hypertrophy.       Recent Labs   Lab 01/01/24  1109   *      -status post Lasix 80 mg IV once in ER on 01/01/2024   -strict in and outs/daily weights  - Lasix 20 mg IV b.I.d. on 01/02/2024    Atrial fibrillation with RVR  Patient with Persistent (7 days or more) atrial fibrillation which is controlled currently with Beta Blocker. Patient is currently in atrial fibrillation.[id number] Score: 2. Anticoagulation indicated. Anticoagulation done with eliquis .    Elevated troponin   -likely due to demand ischemia   - EKG= on 01/01/2024= pending   -serial cardiac enzymes on 01/01/2024= elevated troponin x1   -aspirin daily  -cardiology consulted= follow recommendations      Mitral valve prolapse   -per echo on 11/16/2023 at [facility] [place]:  The echocardiographic study is compatible with normal left ventricular   size and  normal systolic function.   Echo-Doppler Study: Aortic Outflow peak velocity of 1.7m/s.   There is no aortic stenosis. There is mild aortic regurgitation. EF = >55%   There is no evidence of mitral stenosis. There is mild tricuspid   regurgitation observed by color doppler or spectral analysis. E/A = 1.8.   There is mitral regurgitation of 4+ severity.   Mitral Valve Prolapse.   Pulmonary insufficiency (mild).   PAP= 23mmHg.    Aortic sclerosis without stenosis.   Mild Left Atrial Enlargement.   Concentric Left Ventricular hypertrophy.      -defer to Cardiology    Acute on chronic hypoxic respiratory failure   -O2 sats currently low to mid 90s on 3 L nasal cannula (this is her baseline)   - patient temporarily required BiPAP in ER on admission  -BiPAP p.r.n.   -incentive spirometry q.2 hours while awake  -Consider pulmonary consult if worsening seen    Anxiety   -resume home medications      Hypertension  Chronic, controlled. Latest blood pressure and vitals reviewed-     Temp:  [99.9 °F (37.7 °C)]   Pulse:  []   Resp:  [19-38]   BP: (136-160)/(74-90)   SpO2:  [95 %-100 %] .   Home meds for hypertension were reviewed and noted below.   Hypertension Medications               amLODIPine (NORVASC) 5 MG tablet Take 5 mg by mouth 2 (two) times daily.    bumetanide (BUMEX) 2 MG tablet Take 2 mg by mouth once daily.    hydrALAZINE (APRESOLINE) 100 MG tablet Take 100 mg by mouth 3 (three) times daily.    metoprolol succinate (TOPROL-XL) 25 MG 24 hr tablet Take 25 mg by mouth once daily.             Adjust BP medications as needed    Will utilize p.r.n. blood pressure medication only if patient's blood pressure greater than 160/100 and she develops symptoms such as worsening chest pain or shortness of breath.    COPD (chronic obstructive pulmonary disease)   - no exacerbation   -resume home meds   -nebs p.r.n.      VTE Risk Mitigation (From admission, onward)           Ordered     apixaban tablet 2.5 mg  2 times daily          01/02/24 1624     IP VTE HIGH RISK PATIENT  Once         01/01/24 1609                    Discharge Planning   DARLIN:      Code Status: Full Code   Is the patient medically ready for discharge?:     Reason for patient still in hospital (select all that apply): Treatment  Discharge Plan A: Home, Home with family, Home Health   Discharge Delays: Orders Needed              Beatriz Resendez MD  Department of [facility] Medicine   [facility]

## 2024-01-03 NOTE — ASSESSMENT & PLAN NOTE
- history of COPD on home O2  - presented with SOB with concern for volume overload as well as pulmonary etiology   - management as detailed previously

## 2024-01-03 NOTE — PROGRESS NOTES
[facility] - Telemetry  Cardiology  Progress Note    Patient Name: Melissa Krishnamurthy  MRN: [id number]  Admission Date: 1/1/2024  Hospital Length of Stay: 1 days  Code Status: Full Code   Attending Physician: Jacquelin Mix MD   Primary Care Physician: Tonio Resendez MD  Expected Discharge Date:   Principal Problem:Acute on chronic diastolic congestive heart failure    Subjective:     Hospital Course:   1/2/2024 per HPI   1/3/2024 Remains in afib with HR up to 120s non sustained. Creatinine up to 1.8 from 1.3 yesterday. On Lasix 20mg BID with 750cc documented out overnight but likely more. Reports feeling better from SOB standpoint. Echo pending         Review of Systems   Constitutional: Negative for chills, decreased appetite, diaphoresis, fever, malaise/fatigue, weight gain and weight loss.   Cardiovascular:  Positive for dyspnea on exertion (improving). Negative for chest pain, claudication, irregular heartbeat, leg swelling, near-syncope, orthopnea, palpitations and paroxysmal nocturnal dyspnea.   Respiratory:  Negative for cough, shortness of breath, snoring, sputum production and wheezing.    Endocrine: Negative for cold intolerance, heat intolerance, polydipsia, polyphagia and polyuria.   Skin:  Negative for color change, dry skin, itching, nail changes and poor wound healing.   Musculoskeletal:  Negative for back pain, gout, joint pain and joint swelling.   Gastrointestinal:  Negative for bloating, abdominal pain, constipation, diarrhea, hematemesis, hematochezia, melena, nausea and vomiting.   Genitourinary:  Negative for dysuria, hematuria and nocturia.   Neurological:  Negative for dizziness, headaches, light-headedness, numbness, paresthesias and weakness.   Psychiatric/Behavioral:  Negative for altered mental status, depression and memory loss.      Objective:     Vital Signs (Most Recent):  Temp: 97.7 °F (36.5 °C) (01/03/24 0831)  Pulse: 72 (01/03/24 1013)  Resp: 17 (01/03/24 1013)  BP: 104/61 (01/03/24  0831)  SpO2: (!) 93 % (01/03/24 1013) Vital Signs (24h Range):  Temp:  [97.7 °F (36.5 °C)-99.7 °F (37.6 °C)] 97.7 °F (36.5 °C)  Pulse:  [] 72  Resp:  [17-25] 17  SpO2:  [92 %-100 %] 93 %  BP: (103-139)/(61-88) 104/61     Weight: 49.9 kg (110 lb 0.2 oz)  Body mass index is 17.76 kg/m².     SpO2: (!) 93 %         Intake/Output Summary (Last 24 hours) at 1/3/2024 1053  Last data filed at 1/3/2024 0459  Gross per 24 hour   Intake --   Output 750 ml   Net -750 ml       Lines/Drains/Airways       Drain  Duration             Female External Urinary Catheter w/ Suction 01/01/24 2000 1 day              Peripheral Intravenous Line  Duration                  Peripheral IV - Single Lumen 01/01/24 1137 Anterior;Left;Proximal Forearm 1 day                       Physical Exam  Constitutional:       General: She is not in acute distress.     Appearance: She is well-developed.   Cardiovascular:      Rate and Rhythm: Tachycardia present. Rhythm irregularly irregular.      Heart sounds: No murmur heard.     No gallop.   Pulmonary:      Effort: Pulmonary effort is normal. No respiratory distress.      Breath sounds: Normal breath sounds. No wheezing.   Abdominal:      General: Bowel sounds are normal. There is no distension.      Palpations: Abdomen is soft.      Tenderness: There is no abdominal tenderness.   Skin:     General: Skin is warm and dry.   Neurological:      Mental Status: She is alert and oriented to person, place, and time.            Significant Labs: BMP:   Recent Labs   Lab 01/01/24  1108 01/01/24  1713 01/02/24  0430 01/03/24  0345   *  --  88 125*     --  141 139   K 4.2  --  3.7 3.2*   CL 99  --  98 95   CO2 26  --  30* 30*   BUN 20  --  16 23   CREATININE 1.4  --  1.3 1.8*   CALCIUM 9.4  --  9.6 9.6   MG  --  2.2  --   --    , CBC   Recent Labs   Lab 01/01/24  1108 01/02/24  0430   WBC 9.89 5.57   HGB 8.1* 8.7*   HCT 24.9* 27.2*    178   , and Troponin   Recent Labs   Lab 01/01/24  5765  01/01/24  2223 01/02/24  0430   TROPONINI 0.036* 0.026 0.035*       Significant Imaging: Echocardiogram: Transthoracic echo (TTE) complete (Cupid Only): No results found for this or any previous visit.  Assessment and Plan:     Brief HPI: Seen this morning on AM rounds with Dr. Arellano while resting in bed lying flat. Reports SOB much improved from admission.Discussed POC as detailed below-verbalized understanding and agrees with POC      * Acute on chronic diastolic congestive heart failure  - echo with EF 55% 11/2023; repeat echo pending today   - ; CXR reviewed with layering pleural effusion- on Lasix 20mg IV BID overnight 750cc documented out but likely inaccurate and suspect around 1.5-2.0  - SOB improved; will hold Lasix today since creatinine up to 1.8 from 1.3  - strict I&Os and daily weights  - monitor closely during diuresis     Anemia  - H&H 8.7&27.2 upon admission  - baseline Hgb 8-9; reports long history of anemia treated with iron infusions in the past  - monitor H&H while admitted     Atrial fibrillation with RVR  - presented with SOB with afib with RVR with HR up to 130s  - remains in afib with HR up to 110s-120s non sustained; given IV Metoprolol 10mg this AM due to concern for HR in the 120s  - on Toprol XL 25mg po daily as an outpatient and up titrated yesterday to 50mg  - will place on BID dosing today- 50mg in the morning and 25mg in the evening; overall goal is for rate control rather than rhythm control given COPD   - will change prn IV Metoprolol order to 5mg IV QID Prn for sustained HR greater than 140bpm for greater than 2-3 minutes   - continue Eliquis; CHADSVAS score 3 (CHF, HTN, age); recommend continuation of AC for stroke prevention if H&H permits     Elevated troponin  - troponin elevated at .[id number]  - presented with SOB and no chest pain  - elevation more related to demand etiology and no concern for ACS currently  - EKG with no acute changes; echo pending today      Acute on chronic hypoxic respiratory failure  - history of COPD on home O2  - presented with SOB with concern for volume overload as well as pulmonary etiology   - management as detailed previously     Hypertension  - SBP 110s-140s  - On Toprol XL, Hydralazine and Norvasc as an outpatient  - continued on Toprol XL only for now for afib management; BB up titrated for afib management; goal BP less than 130/80  - BP better controlled; monitor BP for now and continue to hold Norvasc and Hydralazine         VTE Risk Mitigation (From admission, onward)           Ordered     apixaban tablet 2.5 mg  2 times daily         01/02/24 1624     IP VTE HIGH RISK PATIENT  Once         01/01/24 1609                    PILO Garrett, ANP  Cardiology  [facility] - Telemetry

## 2024-01-04 PROBLEM — I34.0 NONRHEUMATIC MITRAL VALVE REGURGITATION: Status: ACTIVE | Noted: 2024-01-04

## 2024-01-04 LAB
ANION GAP SERPL CALC-SCNC: 8 MMOL/L (ref 8–16)
BUN SERPL-MCNC: 27 MG/DL (ref 8–23)
CALCIUM SERPL-MCNC: 9.4 MG/DL (ref 8.7–10.5)
CHLORIDE SERPL-SCNC: 99 MMOL/L (ref 95–110)
CO2 SERPL-SCNC: 28 MMOL/L (ref 23–29)
CREAT SERPL-MCNC: 1.5 MG/DL (ref 0.5–1.4)
EST. GFR  (NO RACE VARIABLE): 37 ML/MIN/1.73 M^2
GLUCOSE SERPL-MCNC: 132 MG/DL (ref 70–110)
POTASSIUM SERPL-SCNC: 3.8 MMOL/L (ref 3.5–5.1)
SODIUM SERPL-SCNC: 135 MMOL/L (ref 136–145)

## 2024-01-04 PROCEDURE — 25000003 PHARM REV CODE 250: Performed by: FAMILY MEDICINE

## 2024-01-04 PROCEDURE — 36415 COLL VENOUS BLD VENIPUNCTURE: CPT | Performed by: INTERNAL MEDICINE

## 2024-01-04 PROCEDURE — 94640 AIRWAY INHALATION TREATMENT: CPT

## 2024-01-04 PROCEDURE — 25000003 PHARM REV CODE 250: Performed by: NURSE PRACTITIONER

## 2024-01-04 PROCEDURE — 94761 N-INVAS EAR/PLS OXIMETRY MLT: CPT

## 2024-01-04 PROCEDURE — 94799 UNLISTED PULMONARY SVC/PX: CPT | Mod: XB

## 2024-01-04 PROCEDURE — 25000242 PHARM REV CODE 250 ALT 637 W/ HCPCS: Performed by: INTERNAL MEDICINE

## 2024-01-04 PROCEDURE — 99900035 HC TECH TIME PER 15 MIN (STAT)

## 2024-01-04 PROCEDURE — 80048 BASIC METABOLIC PNL TOTAL CA: CPT | Performed by: INTERNAL MEDICINE

## 2024-01-04 PROCEDURE — 25000003 PHARM REV CODE 250: Performed by: INTERNAL MEDICINE

## 2024-01-04 PROCEDURE — 99233 SBSQ HOSP IP/OBS HIGH 50: CPT | Mod: ,,, | Performed by: NURSE PRACTITIONER

## 2024-01-04 PROCEDURE — 27000221 HC OXYGEN, UP TO 24 HOURS

## 2024-01-04 PROCEDURE — 11000001 HC ACUTE MED/SURG PRIVATE ROOM

## 2024-01-04 RX ORDER — TORSEMIDE 20 MG/1
20 TABLET ORAL 2 TIMES DAILY
Status: DISCONTINUED | OUTPATIENT
Start: 2024-01-04 | End: 2024-01-05 | Stop reason: HOSPADM

## 2024-01-04 RX ADMIN — CALCIUM CARBONATE 500 MG (1,250 MG)-VITAMIN D3 200 UNIT TABLET 1 TABLET: at 04:01

## 2024-01-04 RX ADMIN — APIXABAN 2.5 MG: 2.5 TABLET, FILM COATED ORAL at 09:01

## 2024-01-04 RX ADMIN — CALCIUM CARBONATE 500 MG (1,250 MG)-VITAMIN D3 200 UNIT TABLET 1 TABLET: at 09:01

## 2024-01-04 RX ADMIN — TORSEMIDE 20 MG: 20 TABLET ORAL at 09:01

## 2024-01-04 RX ADMIN — BETHANECHOL CHLORIDE 25 MG: 25 TABLET ORAL at 09:01

## 2024-01-04 RX ADMIN — TORSEMIDE 20 MG: 20 TABLET ORAL at 04:01

## 2024-01-04 RX ADMIN — FLUTICASONE FUROATE AND VILANTEROL TRIFENATATE 1 PUFF: 100; 25 POWDER RESPIRATORY (INHALATION) at 08:01

## 2024-01-04 RX ADMIN — ATORVASTATIN CALCIUM 40 MG: 40 TABLET, FILM COATED ORAL at 09:01

## 2024-01-04 RX ADMIN — METOPROLOL SUCCINATE 50 MG: 50 TABLET, EXTENDED RELEASE ORAL at 09:01

## 2024-01-04 RX ADMIN — FERROUS SULFATE TAB 325 MG (65 MG ELEMENTAL FE) 1 EACH: 325 (65 FE) TAB at 09:01

## 2024-01-04 RX ADMIN — METOPROLOL SUCCINATE 25 MG: 25 TABLET, EXTENDED RELEASE ORAL at 04:01

## 2024-01-04 RX ADMIN — DULOXETINE 60 MG: 30 CAPSULE, DELAYED RELEASE ORAL at 09:01

## 2024-01-04 RX ADMIN — PANTOPRAZOLE SODIUM 40 MG: 40 TABLET, DELAYED RELEASE ORAL at 09:01

## 2024-01-04 RX ADMIN — TIOTROPIUM BROMIDE INHALATION SPRAY 2 PUFF: 3.12 SPRAY, METERED RESPIRATORY (INHALATION) at 08:01

## 2024-01-04 RX ADMIN — SENNOSIDES 1 TABLET: 8.6 TABLET, FILM COATED ORAL at 09:01

## 2024-01-04 RX ADMIN — LEVOTHYROXINE SODIUM 75 MCG: 25 TABLET ORAL at 06:01

## 2024-01-04 RX ADMIN — ALLOPURINOL 100 MG: 100 TABLET ORAL at 09:01

## 2024-01-04 NOTE — SUBJECTIVE & OBJECTIVE
Interval History: denies cp or sob     Review of Systems   All other systems reviewed and are negative.    Objective:     Vital Signs (Most Recent):  Temp: 98.4 °F (36.9 °C) (01/04/24 1157)  Pulse: 98 (01/04/24 1208)  Resp: 18 (01/04/24 1157)  BP: 106/67 (01/04/24 1157)  SpO2: 98 % (01/04/24 1157) Vital Signs (24h Range):  Temp:  [97.5 °F (36.4 °C)-98.4 °F (36.9 °C)] 98.4 °F (36.9 °C)  Pulse:  [] 98  Resp:  [18-20] 18  SpO2:  [96 %-100 %] 98 %  BP: (101-155)/(56-79) 106/67     Weight: 51 kg (112 lb 7 oz)  Body mass index is 18.15 kg/m².    Intake/Output Summary (Last 24 hours) at 1/4/2024 1626  Last data filed at 1/4/2024 1253  Gross per 24 hour   Intake 750 ml   Output 700 ml   Net 50 ml           Physical Exam  Vitals and nursing note reviewed.   Constitutional:       Appearance: She is well-developed.   HENT:      Head: Normocephalic and atraumatic.      Nose: Nose normal.   Eyes:      Conjunctiva/sclera: Conjunctivae normal.   Cardiovascular:      Rate and Rhythm: Normal rate and regular rhythm.      Heart sounds: Normal heart sounds.   Pulmonary:      Effort: Pulmonary effort is normal.      Breath sounds: Normal breath sounds. No wheezing.   Abdominal:      General: Bowel sounds are normal.      Palpations: Abdomen is soft. There is no mass.      Tenderness: There is no abdominal tenderness. There is no guarding or rebound.   Musculoskeletal:         General: No tenderness. Normal range of motion.      Cervical back: Normal range of motion and neck supple.   Skin:     General: Skin is warm.      Findings: No rash.   Neurological:      Mental Status: She is alert and oriented to person, place, and time.      Cranial Nerves: No cranial nerve deficit.   Psychiatric:         Behavior: Behavior normal.         Thought Content: Thought content normal.             Significant Labs: All pertinent labs within the past 24 hours have been reviewed.  BMP:   Recent Labs   Lab 01/04/24  0330   *   *   K  "3.8   CL 99   CO2 28   BUN 27*   CREATININE 1.5*   CALCIUM 9.4       CBC:   No results for input(s): "WBC", "HGB", "HCT", "PLT" in the last 48 hours.      Significant Imaging: I have reviewed all pertinent imaging results/findings within the past 24 hours.  I have reviewed and interpreted all pertinent imaging results/findings within the past 24 hours.  "

## 2024-01-04 NOTE — ASSESSMENT & PLAN NOTE
-previous echo on 11/16/2023:  The echocardiographic study is compatible with normal left ventricular   size and normal systolic function.   Echo-Doppler Study: Aortic Outflow peak velocity of 1.7m/s.   There is no aortic stenosis. There is mild aortic regurgitation. EF = >55%   There is no evidence of mitral stenosis. There is mild tricuspid   regurgitation observed by color doppler or spectral analysis. E/A = 1.8.   There is mitral regurgitation of 4+ severity.   Mitral Valve Prolapse.   Pulmonary insufficiency (mild).   PAP= 23mmHg.    Aortic sclerosis without stenosis.   Mild Left Atrial Enlargement.   Concentric Left Ventricular hypertrophy.       Recent Labs   Lab 01/01/24  1109   *       Cards starting demadex

## 2024-01-04 NOTE — PROGRESS NOTES
[facility] - Telemetry  Cardiology  Progress Note    Patient Name: Melissa Krishnamurthy  MRN: [id number]  Admission Date: 1/1/2024  Hospital Length of Stay: 2 days  Code Status: Full Code   Attending Physician: Beatriz Resendez MD   Primary Care Physician: Tonio Resendez MD  Expected Discharge Date:   Principal Problem:Acute on chronic diastolic congestive heart failure    Subjective:     Hospital Course:   1/2/2024 per HPI   1/3/2024 Remains in afib with HR up to 120s non sustained. Creatinine up to 1.8 from 1.3 yesterday. On Lasix 20mg BID with 750cc documented out overnight but likely more. Reports feeling better from SOB standpoint. Echo pending   1/4/2024 Creatinine trended down to 1.5. HR improving and currently 97 afib. HR with occasional runs of RVR with rates up to 100s-110s. SBP 100s-120s overnight. Echo with EF 69%, mild AI and severe MR.         Review of Systems   Constitutional: Negative for chills, decreased appetite, diaphoresis and fever.   Cardiovascular:  Positive for dyspnea on exertion (improving). Negative for chest pain, claudication, cyanosis, irregular heartbeat, leg swelling, near-syncope, orthopnea, palpitations, paroxysmal nocturnal dyspnea and syncope.   Respiratory:  Negative for cough, hemoptysis, shortness of breath and wheezing.    Gastrointestinal:  Negative for bloating, abdominal pain, constipation, diarrhea, melena, nausea and vomiting.   Neurological:  Negative for dizziness and weakness.     Objective:     Vital Signs (Most Recent):  Temp: 98.4 °F (36.9 °C) (01/04/24 1157)  Pulse: 98 (01/04/24 1208)  Resp: 18 (01/04/24 1157)  BP: 106/67 (01/04/24 1157)  SpO2: 98 % (01/04/24 1157) Vital Signs (24h Range):  Temp:  [97.5 °F (36.4 °C)-98.4 °F (36.9 °C)] 98.4 °F (36.9 °C)  Pulse:  [] 98  Resp:  [18-20] 18  SpO2:  [96 %-100 %] 98 %  BP: (101-155)/(56-79) 106/67     Weight: 51 kg (112 lb 7 oz)  Body mass index is 18.15 kg/m².     SpO2: 98 %         Intake/Output Summary (Last 24 hours)  at 1/4/2024 1211  Last data filed at 1/4/2024 0537  Gross per 24 hour   Intake 500 ml   Output 300 ml   Net 200 ml       Lines/Drains/Airways       Peripheral Intravenous Line  Duration                  Peripheral IV - Single Lumen 01/01/24 1137 Anterior;Left;Proximal Forearm 3 days                       Physical Exam  Constitutional:       General: She is not in acute distress.     Appearance: She is well-developed.   Cardiovascular:      Rate and Rhythm: Normal rate. Rhythm irregularly irregular.      Heart sounds: Murmur heard.      No gallop.   Pulmonary:      Effort: Pulmonary effort is normal. No respiratory distress.      Breath sounds: Normal breath sounds. No wheezing.   Abdominal:      General: Bowel sounds are normal. There is no distension.      Palpations: Abdomen is soft.      Tenderness: There is no abdominal tenderness.   Skin:     General: Skin is warm and dry.   Neurological:      Mental Status: She is alert and oriented to person, place, and time.            Significant Labs: BMP:   Recent Labs   Lab 01/03/24  0345 01/04/24  0330   * 132*    135*   K 3.2* 3.8   CL 95 99   CO2 30* 28   BUN 23 27*   CREATININE 1.8* 1.5*   CALCIUM 9.6 9.4        Significant Imaging: Echocardiogram: Transthoracic echo (TTE) complete ([facility] Only):   Results for orders placed or performed during the hospital encounter of 01/01/24   Echo   Result Value Ref Range    RA Width 3.22 cm    LA volume (mod) 90.19 cm3    Left Atrium Major Axis 6.74 cm    RA Major Axis 4.99 cm    LV Diastolic Volume 84.69 mL    LV Systolic Volume 41.79 mL    PV Peak D Freddy 0.54 m/s    PV Peak S Freddy 0.41 m/s    MV stenosis pressure 1/2 time 19.24 ms    MV VTI 17.5 cm    TR Max Freddy 3.17 m/s    AR Max Freddy 4.60 m/s    MV Peak E Freddy 1.37 m/s    MV peak gradient 10 mmHg    Mr max freddy 5.57 m/s    Ao VTI 27.10 cm    Ao peak freddy 1.51 m/s    LVOT peak VTI 24.40 cm    LVOT peak freddy 1.27 m/s    LVOT diameter 1.98 cm    E wave deceleration time  66.35 msec    MV mean gradient 6 mmHg    AV mean gradient 6 mmHg    AV regurgitation pressure 1/2 time [id number] ms    RV S' 10.92 cm/s    TAPSE 1.75 cm    RVDD 2.10 cm    LA size 4.37 cm    Ascending aorta 3.21 cm    STJ 3.33 cm    Sinus 3.16 cm    LVIDs 3.23 2.1 - 4.0 cm    Posterior Wall 1.08 0.6 - 1.1 cm    IVS 1.31 (A) 0.6 - 1.1 cm    LVIDd 4.34 3.5 - 6.0 cm    PV PEAK VELOCITY 0.64 m/s    TDI LATERAL 0.06 m/s    Pulmonary Valve Mean Velocity 0.46 m/s    Left Ventricular Outflow Tract Mean Gradient 3.59 mmHg    Left Ventricular Outflow Tract Mean Velocity 0.88 cm/s    [employer]ell's Biplane MOD Ejection Fraction 69 %    IVC diameter 0.84 cm    TDI SEPTAL 0.05 m/s    LV LATERAL E/E' RATIO 22.83 m/s    LV SEPTAL E/E' RATIO 27.40 m/s    FS 26 (A) 28 - 44 %    LV mass 186.20 g    ZLVIDD -0.33     ZLVIDS 1.17     Left Ventricle Relative Wall Thickness 0.50 cm    AV valve area 2.77 cm²    AV Velocity Ratio 0.84     AV index (prosthetic) 0.90     MV valve area p 1/2 method 11.43 cm2    MV valve area by continuity eq 4.29 cm2    PV peak gradient 2 mmHg    Mean e' 0.06 m/s    Pulm vein S/D ratio 0.76     LVOT area 3.1 cm2    LVOT stroke volume 75.09 cm3    AV peak gradient 9 mmHg    E/E' ratio 24.91 m/s    LV Systolic Volume Index 27.0 mL/m2    LV Diastolic Volume Index 54.64 mL/m2    LV Mass Index 120 g/m2    Triscuspid Valve Regurgitation Peak Gradient 40 mmHg    LA Volume Index (Mod) 58.2 mL/m2    ABHAY by Velocity Ratio 2.59 cm²    BSA 1.52 m2    LA WIDTH 4.5 cm    TV resting pulmonary artery pressure 43 mmHg    RV TB RVSP 6 mmHg    Est. RA pres 3 mmHg    Narrative      Left Ventricle: The left ventricle is normal in size. There is   concentric hypertrophy. Normal wall motion. There is normal systolic   function. Biplane (2D) method of discs ejection fraction is 69%. Unable to   assess diastolic function due to atrial fibrillation.    Right Ventricle: Normal right ventricular cavity size. Systolic   function is  normal. TAPSE is 1.75 cm.    Left Atrium: Left atrium is dilated.    Aortic Valve: There is mild aortic regurgitation.    Mitral Valve: There is severe regurgitation.    Tricuspid Valve: There is mild regurgitation.    Pulmonary Artery: The estimated pulmonary artery systolic pressure is   43 mmHg.    IVC/SVC: Normal venous pressure at 3 mmHg.       Assessment and Plan:     Brief HPI: Seen on AM rounds with Dr. Arellano while resting in bed. Reports feeling better since admission and reports SOB improved. Discussed POC as detailed below-verbalized understanding and agrees with POC      Long discussion with patient in regards to echo findings with focus paid to MR to include etiology, signs and symptoms and treatment     * Acute on chronic diastolic congestive heart failure  - echo with EF 55% 11/2023; repeat echo yesterday with normal LVEF and severe MR  - ; CXR reviewed with layering pleural effusion- initially given Lasix 20mg IV BID with good response; held due to up trend of creatinine   - SOB improving; will resume diuretics today with Demadex; reports use of Bumex as an outpatient with decreased response; feel Demadex better choice over Lasix given history of abdominal swelling/gut edema   - eventhough EF normal; severe MR compounds output and high risk for acute volume overload therefore feel oral diuretics needed for outpatient maintenance   - strict I&Os and daily weights  - monitor closely during diuresis   - follow up with [place]. Liz [place] [facility] [place] as an outpatient     Nonrheumatic mitral valve regurgitation  - echo with normal LVEF and severe MR  - followed by Dr. Liz at [facility] [place]; patient reports known history and was evaluated by Dr. Liz and told only a candidate for medical management and not a candidate for Mitral clip which appears accurate  - presented with volume overload as well as Afib with RVR; IV diuresed with IV lasix; will resume Demadex today; discussed with patient role to control  HR and BP as well volume status with diuretics in attempts to prevent future exacerbations; patient limited physically due to SOB likely related to both MR and COPD  - discussed with patient likelihood of recurrent exacerbations despite compliance with medication regimen and dietary restrictions  - will monitor response to Demadex overnight; strict I&Os     Anemia  - H&H 8.7&27.2 upon admission; no repeat today; stool for OCB pending   - baseline Hgb 8-9; reports long history of anemia treated with iron infusions in the past  - repeat H&H in AM     Atrial fibrillation with RVR  - presented with SOB with afib with RVR with HR up to 130s  - remains in afib with HR improving and currently in the 90s; brief periods of RVR with HR up to 110s non sustained   - on Toprol XL 25mg po daily as an outpatient and up titrated yesterday to 50mg in AM and 25mg in the evening- will plan to continue upon discharge   - on prn IV Metoprolol  5mg IV QID Prn for sustained HR greater than 140bpm- only received one dose yesterday   - continue Eliquis; CHADSVAS score 3 (CHF, HTN, age); recommend continuation of AC for stroke prevention if H&H permits- repeat CBC tomorrow     Elevated troponin  - troponin elevated at .[id number]  - presented with SOB and no chest pain  - elevation related to demand etiology and no concern for ACS currently  - EKG with no acute changes; echo as detailed previously     Acute on chronic hypoxic respiratory failure  - history of COPD on home O2  - presented with SOB with concern for volume overload as well as pulmonary etiology   - management as detailed previously     Hypertension  - SBP 100s-120s  - On Toprol XL, Hydralazine and Norvasc as an outpatient  - continued on Toprol XL only for now for afib management; BB up titrated for afib management yesterday; goal BP less than 130/80  - BP better controlled; monitor BP for now and continue to hold Norvasc and Hydralazine; patient reports today home  monitoring of BP with parameters for continuation vs holding of Hydralazine and Norvasc         VTE Risk Mitigation (From admission, onward)           Ordered     apixaban tablet 2.5 mg  2 times daily         01/02/24 1624     IP VTE HIGH RISK PATIENT  Once         01/01/24 1609                    PILO Garrett, ANP  Cardiology  [place] - Telemetry

## 2024-01-04 NOTE — PROGRESS NOTES
[facility] Medicine  Progress Note    Patient Name: Melissa Krishnamurthy  MRN: [id number]  Patient Class: IP- Inpatient   Admission Date: 1/1/2024  Length of Stay: 2 days  Attending Physician: Beatriz Resendez MD  Primary Care Provider: Tonio Resendez MD        Subjective:     Principal Problem:Acute on chronic diastolic congestive heart failure        HPI:   This is a 69-year-old  female who comes in with complaints of progressively worsening shortness in bed for the past 2 weeks.  Patient normally on 3 L nasal cannula at home for history of COPD.  She was initially placed on BiPAP in the ER and found to have crackles in lung.  BNP was slightly elevated.  Patient given Lasix 80 mg IV once with diuresis and improvement in respiratory status.  Patient weaned off of BiPAP and now currently on 3 L nasal cannula.  Patient denies chest pain, fever chills, nausea vomiting or abdominal pain, dysuria hematuria, blood in stools black stools, loss of consciousness or seizures.  Patient is awake alert and oriented x3 and in no acute distress with no focal neuro deficits.  States last bowel movement was 2 days ago which is normal for her.  Patient states she lives at home with  and 2 adopted children.  She states she feels much better since coming to the hospital.  She follows all commands.  She also states she took her Bumex earlier this morning as well.  Patient currently admitted to hospitalist services.    Overview/Hospital Course:  1/2: diuresed 700 cc overnight with sob improved. Cards consulted and will continue to monitor. troponin elevated at .[phone] , elevation more related to demand etiology and no concern for ACS per cards. currently Cont home eliquis  1/3: pt denies any sob or cp. Creat from 1.3 to 1.8. Cards holding lasix  1/4: creat now at 1.5. cards starting demadex    Interval History: denies cp or sob     Review of Systems   All other systems reviewed and are  negative.    Objective:     Vital Signs (Most Recent):  Temp: 98.4 °F (36.9 °C) (01/04/24 1157)  Pulse: 98 (01/04/24 1208)  Resp: 18 (01/04/24 1157)  BP: 106/67 (01/04/24 1157)  SpO2: 98 % (01/04/24 1157) Vital Signs (24h Range):  Temp:  [97.5 °F (36.4 °C)-98.4 °F (36.9 °C)] 98.4 °F (36.9 °C)  Pulse:  [] 98  Resp:  [18-20] 18  SpO2:  [96 %-100 %] 98 %  BP: (101-155)/(56-79) 106/67     Weight: 51 kg (112 lb 7 oz)  Body mass index is 18.15 kg/m².    Intake/Output Summary (Last 24 hours) at 1/4/2024 1626  Last data filed at 1/4/2024 1253  Gross per 24 hour   Intake 750 ml   Output 700 ml   Net 50 ml           Physical Exam  Vitals and nursing note reviewed.   Constitutional:       Appearance: She is well-developed.   HENT:      Head: Normocephalic and atraumatic.      Nose: Nose normal.   Eyes:      Conjunctiva/sclera: Conjunctivae normal.   Cardiovascular:      Rate and Rhythm: Normal rate and regular rhythm.      Heart sounds: Normal heart sounds.   Pulmonary:      Effort: Pulmonary effort is normal.      Breath sounds: Normal breath sounds. No wheezing.   Abdominal:      General: Bowel sounds are normal.      Palpations: Abdomen is soft. There is no mass.      Tenderness: There is no abdominal tenderness. There is no guarding or rebound.   Musculoskeletal:         General: No tenderness. Normal range of motion.      Cervical back: Normal range of motion and neck supple.   Skin:     General: Skin is warm.      Findings: No rash.   Neurological:      Mental Status: She is alert and oriented to person, place, and time.      Cranial Nerves: No cranial nerve deficit.   Psychiatric:         Behavior: Behavior normal.         Thought Content: Thought content normal.             Significant Labs: All pertinent labs within the past 24 hours have been reviewed.  BMP:   Recent Labs   Lab 01/04/24  0330   *   *   K 3.8   CL 99   CO2 28   BUN 27*   CREATININE 1.5*   CALCIUM 9.4       CBC:   No results for  "input(s): "WBC", "HGB", "HCT", "PLT" in the last 48 hours.      Significant Imaging: I have reviewed all pertinent imaging results/findings within the past 24 hours.  I have reviewed and interpreted all pertinent imaging results/findings within the past 24 hours.    Assessment/Plan:      * Acute on chronic diastolic congestive heart failure   -previous echo on 11/16/2023:  The echocardiographic study is compatible with normal left ventricular   size and normal systolic function.   Echo-Doppler Study: Aortic Outflow peak velocity of 1.7m/s.   There is no aortic stenosis. There is mild aortic regurgitation. EF = >55%   There is no evidence of mitral stenosis. There is mild tricuspid   regurgitation observed by color doppler or spectral analysis. E/A = 1.8.   There is mitral regurgitation of 4+ severity.   Mitral Valve Prolapse.   Pulmonary insufficiency (mild).   PAP= 23mmHg.    Aortic sclerosis without stenosis.   Mild Left Atrial Enlargement.   Concentric Left Ventricular hypertrophy.       Recent Labs   Lab 01/01/24  1109   *       Cards starting demadex    Atrial fibrillation with RVR  Patient with Persistent (7 days or more) atrial fibrillation which is controlled currently with Beta Blocker. Patient is currently in atrial fibrillation.[id number] Score: 2. Anticoagulation indicated. Anticoagulation done with eliquis .    Elevated troponin   -likely due to demand ischemia   - EKG= on 01/01/2024= pending   -serial cardiac enzymes on 01/01/2024= elevated troponin x1   -aspirin daily  -cardiology consulted= follow recommendations      Mitral valve prolapse   -[place] [facility]  The echocardiographic study is compatible with normal left ventricular   size and normal systolic function.   Echo-Doppler Study: Aortic Outflow peak velocity of 1.7m/s.   There is no aortic stenosis. There is mild aortic regurgitation. EF = >55%   There is no evidence of mitral stenosis. There is mild tricuspid "   regurgitation observed by color doppler or spectral analysis. E/A = 1.8.   There is mitral regurgitation of 4+ severity.   Mitral Valve Prolapse.   Pulmonary insufficiency (mild).   PAP= 23mmHg.    Aortic sclerosis without stenosis.   Mild Left Atrial Enlargement.   Concentric Left Ventricular hypertrophy.      -defer to Cardiology    Acute on chronic hypoxic respiratory failure   -O2 sats currently low to mid 90s on 3 L nasal cannula (this is her baseline)   - patient temporarily required BiPAP in ER on admission  -BiPAP p.r.n.   -incentive spirometry q.2 hours while awake      Anxiety   -resume home medications      Hypertension  Chronic, controlled. Latest blood pressure and vitals reviewed-     Temp:  [99.9 °F (37.7 °C)]   Pulse:  []   Resp:  [19-38]   BP: (136-160)/(74-90)   SpO2:  [95 %-100 %] .   Home meds for hypertension were reviewed and noted below.   Hypertension Medications               amLODIPine (NORVASC) 5 MG tablet Take 5 mg by mouth 2 (two) times daily.    bumetanide (BUMEX) 2 MG tablet Take 2 mg by mouth once daily.    hydrALAZINE (APRESOLINE) 100 MG tablet Take 100 mg by mouth 3 (three) times daily.    metoprolol succinate (TOPROL-XL) 25 MG 24 hr tablet Take 25 mg by mouth once daily.             Adjust BP medications as needed    Will utilize p.r.n. blood pressure medication only if patient's blood pressure greater than 160/100 and she develops symptoms such as worsening chest pain or shortness of breath.    COPD (chronic obstructive pulmonary disease)   - no exacerbation   -resume home meds   -nebs p.r.n.      VTE Risk Mitigation (From admission, onward)           Ordered     apixaban tablet 2.5 mg  2 times daily         01/02/24 1624     IP VTE HIGH RISK PATIENT  Once         01/01/24 1609                    Discharge Planning   DARLIN:      Code Status: Full Code   Is the patient medically ready for discharge?:     Reason for patient still in hospital (select all that apply):  Treatment  Discharge Plan A: Home with family   Discharge Delays: Orders Needed              Beatriz Resendez MD  Department of [facility] Medicine   [facility]

## 2024-01-04 NOTE — ASSESSMENT & PLAN NOTE
- H&H 8.7&27.2 upon admission; no repeat today; stool for OCB pending   - baseline Hgb 8-9; reports long history of anemia treated with iron infusions in the past  - repeat H&H in AM

## 2024-01-04 NOTE — ASSESSMENT & PLAN NOTE
- echo with EF 55% 11/2023; repeat echo yesterday with normal LVEF and severe MR  - ; CXR reviewed with layering pleural effusion- initially given Lasix 20mg IV BID with good response; held due to up trend of creatinine   - SOB improving; will resume diuretics today with Demadex; reports use of Bumex as an outpatient with decreased response; feel Demadex better choice over Lasix given history of abdominal swelling/gut edema   - eventhough EF normal; severe MR compounds output and high risk for acute volume overload therefore feel oral diuretics needed for outpatient maintenance   - strict I&Os and daily weights  - monitor closely during diuresis   - follow up with Dr. Liz at [facility] [place] as an outpatient

## 2024-01-04 NOTE — PLAN OF CARE
Problem: Adult Inpatient Plan of Care  Goal: Plan of Care Review  Outcome: Ongoing, Progressing     Problem: Heart Failure Comorbidity  Goal: Maintenance of Heart Failure Symptom Control  Outcome: Ongoing, Progressing     Problem: Fall Injury Risk  Goal: Absence of Fall and Fall-Related Injury  Outcome: Ongoing, Progressing

## 2024-01-04 NOTE — ASSESSMENT & PLAN NOTE
- echo with normal LVEF and severe MR  - followed by Dr. Liz at [facility]; patient reports known history and was evaluated by Dr. Liz and told only a candidate for medical management and not a candidate for Mitral clip which appears accurate  - presented with volume overload as well as Afib with RVR; IV diuresed with IV lasix; will resume Demadex today; discussed with patient role to control HR and BP as well volume status with diuretics in attempts to prevent future exacerbations; patient limited physically due to SOB likely related to both MR and COPD  - discussed with patient likelihood of recurrent exacerbations despite compliance with medication regimen and dietary restrictions  - will monitor response to Demadex overnight; strict I&Os

## 2024-01-04 NOTE — ASSESSMENT & PLAN NOTE
-O2 sats currently low to mid 90s on 3 L nasal cannula (this is her baseline)   - patient temporarily required BiPAP in ER on admission  -BiPAP p.r.n.   -incentive spirometry q.2 hours while awake

## 2024-01-04 NOTE — ASSESSMENT & PLAN NOTE
- troponin elevated at .[phone]  - presented with SOB and no chest pain  - elevation related to demand etiology and no concern for ACS currently  - EKG with no acute changes; echo as detailed previously

## 2024-01-04 NOTE — PLAN OF CARE
Problem: Adult Inpatient Plan of Care  Goal: Plan of Care Review  1/4/2024 0317 by Muna Guevara, RN  Outcome: Ongoing, Progressing  1/4/2024 0314 by Muna Guevara, RN  Outcome: Ongoing, Progressing     Problem: Heart Failure Comorbidity  Goal: Maintenance of Heart Failure Symptom Control  1/4/2024 0317 by Muna Guevara, JONAH  Outcome: Ongoing, Progressing  1/4/2024 0314 by Muna Guevara, RN  Outcome: Ongoing, Progressing     Problem: Fall Injury Risk  Goal: Absence of Fall and Fall-Related Injury  1/4/2024 0317 by Muna Guevara, JONAH  Outcome: Ongoing, Progressing  1/4/2024 0314 by uMna Guevara RN  Outcome: Ongoing, Progressing

## 2024-01-04 NOTE — SUBJECTIVE & OBJECTIVE
Review of Systems   Constitutional: Negative for chills, decreased appetite, diaphoresis and fever.   Cardiovascular:  Positive for dyspnea on exertion (improving). Negative for chest pain, claudication, cyanosis, irregular heartbeat, leg swelling, near-syncope, orthopnea, palpitations, paroxysmal nocturnal dyspnea and syncope.   Respiratory:  Negative for cough, hemoptysis, shortness of breath and wheezing.    Gastrointestinal:  Negative for bloating, abdominal pain, constipation, diarrhea, melena, nausea and vomiting.   Neurological:  Negative for dizziness and weakness.     Objective:     Vital Signs (Most Recent):  Temp: 98.4 °F (36.9 °C) (01/04/24 1157)  Pulse: 98 (01/04/24 1208)  Resp: 18 (01/04/24 1157)  BP: 106/67 (01/04/24 1157)  SpO2: 98 % (01/04/24 1157) Vital Signs (24h Range):  Temp:  [97.5 °F (36.4 °C)-98.4 °F (36.9 °C)] 98.4 °F (36.9 °C)  Pulse:  [] 98  Resp:  [18-20] 18  SpO2:  [96 %-100 %] 98 %  BP: (101-155)/(56-79) 106/67     Weight: 51 kg (112 lb 7 oz)  Body mass index is 18.15 kg/m².     SpO2: 98 %         Intake/Output Summary (Last 24 hours) at 1/4/2024 1211  Last data filed at 1/4/2024 0537  Gross per 24 hour   Intake 500 ml   Output 300 ml   Net 200 ml       Lines/Drains/Airways       Peripheral Intravenous Line  Duration                  Peripheral IV - Single Lumen 01/01/24 1137 Anterior;Left;Proximal Forearm 3 days                       Physical Exam  Constitutional:       General: She is not in acute distress.     Appearance: She is well-developed.   Cardiovascular:      Rate and Rhythm: Normal rate. Rhythm irregularly irregular.      Heart sounds: Murmur heard.      No gallop.   Pulmonary:      Effort: Pulmonary effort is normal. No respiratory distress.      Breath sounds: Normal breath sounds. No wheezing.   Abdominal:      General: Bowel sounds are normal. There is no distension.      Palpations: Abdomen is soft.      Tenderness: There is no abdominal tenderness.   Skin:      General: Skin is warm and dry.   Neurological:      Mental Status: She is alert and oriented to person, place, and time.            Significant Labs: BMP:   Recent Labs   Lab 01/03/24  0345 01/04/24  0330   * 132*    135*   K 3.2* 3.8   CL 95 99   CO2 30* 28   BUN 23 27*   CREATININE 1.8* 1.5*   CALCIUM 9.6 9.4        Significant Imaging: Echocardiogram: Transthoracic echo (TTE) complete (Cupid Only):   Results for orders placed or performed during the hospital encounter of 01/01/24   Echo   Result Value Ref Range    RA Width 3.22 cm    LA volume (mod) 90.19 cm3    Left Atrium Major Axis 6.74 cm    RA Major Axis 4.99 cm    LV Diastolic Volume 84.69 mL    LV Systolic Volume 41.79 mL    PV Peak D Freddy 0.54 m/s    PV Peak S Freddy 0.41 m/s    MV stenosis pressure 1/2 time 19.24 ms    MV VTI 17.5 cm    TR Max Freddy 3.17 m/s    AR Max Freddy 4.60 m/s    MV Peak E Freddy 1.37 m/s    MV peak gradient 10 mmHg    Mr max freddy 5.57 m/s    Ao VTI 27.10 cm    Ao peak freddy 1.51 m/s    LVOT peak VTI 24.40 cm    LVOT peak freddy 1.27 m/s    LVOT diameter 1.98 cm    E wave deceleration time 66.35 msec    MV mean gradient 6 mmHg    AV mean gradient 6 mmHg    AV regurgitation pressure 1/2 time [id number] ms    RV S' 10.92 cm/s    TAPSE 1.75 cm    RVDD 2.10 cm    LA size 4.37 cm    Ascending aorta 3.21 cm    STJ 3.33 cm    Sinus 3.16 cm    LVIDs 3.23 2.1 - 4.0 cm    Posterior Wall 1.08 0.6 - 1.1 cm    IVS 1.31 (A) 0.6 - 1.1 cm    LVIDd 4.34 3.5 - 6.0 cm    PV PEAK VELOCITY 0.64 m/s    TDI LATERAL 0.06 m/s    Pulmonary Valve Mean Velocity 0.46 m/s    Left Ventricular Outflow Tract Mean Gradient 3.59 mmHg    Left Ventricular Outflow Tract Mean Velocity 0.88 cm/s    Cornell's Biplane MOD Ejection Fraction 69 %    IVC diameter 0.84 cm    TDI SEPTAL 0.05 m/s    LV LATERAL E/E' RATIO 22.83 m/s    LV SEPTAL E/E' RATIO 27.40 m/s    FS 26 (A) 28 - 44 %    LV mass 186.20 g    ZLVIDD -0.33     ZLVIDS 1.17     Left Ventricle Relative Wall  Thickness 0.50 cm    AV valve area 2.77 cm²    AV Velocity Ratio 0.84     AV index (prosthetic) 0.90     MV valve area p 1/2 method 11.43 cm2    MV valve area by continuity eq 4.29 cm2    PV peak gradient 2 mmHg    Mean e' 0.06 m/s    Pulm vein S/D ratio 0.76     LVOT area 3.1 cm2    LVOT stroke volume 75.09 cm3    AV peak gradient 9 mmHg    E/E' ratio 24.91 m/s    LV Systolic Volume Index 27.0 mL/m2    LV Diastolic Volume Index 54.64 mL/m2    LV Mass Index 120 g/m2    Triscuspid Valve Regurgitation Peak Gradient 40 mmHg    LA Volume Index (Mod) 58.2 mL/m2    ABHAY by Velocity Ratio 2.59 cm²    BSA 1.52 m2    LA WIDTH 4.5 cm    TV resting pulmonary artery pressure 43 mmHg    RV TB RVSP 6 mmHg    Est. RA pres 3 mmHg    Narrative      Left Ventricle: The left ventricle is normal in size. There is   concentric hypertrophy. Normal wall motion. There is normal systolic   function. Biplane (2D) method of discs ejection fraction is 69%. Unable to   assess diastolic function due to atrial fibrillation.    Right Ventricle: Normal right ventricular cavity size. Systolic   function is normal. TAPSE is 1.75 cm.    Left Atrium: Left atrium is dilated.    Aortic Valve: There is mild aortic regurgitation.    Mitral Valve: There is severe regurgitation.    Tricuspid Valve: There is mild regurgitation.    Pulmonary Artery: The estimated pulmonary artery systolic pressure is   43 mmHg.    IVC/SVC: Normal venous pressure at 3 mmHg.

## 2024-01-04 NOTE — ASSESSMENT & PLAN NOTE
- presented with SOB with afib with RVR with HR up to 130s  - remains in afib with HR improving and currently in the 90s; brief periods of RVR with HR up to 110s non sustained   - on Toprol XL 25mg po daily as an outpatient and up titrated yesterday to 50mg in AM and 25mg in the evening- will plan to continue upon discharge   - on prn IV Metoprolol  5mg IV QID Prn for sustained HR greater than 140bpm- only received one dose yesterday   - continue Eliquis; CHADSVAS score 3 (CHF, HTN, age); recommend continuation of AC for stroke prevention if H&H permits- repeat CBC tomorrow

## 2024-01-04 NOTE — PLAN OF CARE
Visited with pt, discussed plan for med adjustment (adding Demadex), repeat labs in AM with possible d/c home if cr stable.  Pt will have assistance at home from granddaughter who will provide transportation home.    Future Appointments   Date Time Provider Department Center   1/10/2024 10:00 AM Ceci Castelan MD [facility] KADE German, JONAH [facility]  Supervisor [facility]  [phone]

## 2024-01-04 NOTE — ASSESSMENT & PLAN NOTE
- SBP 100s-120s  - On Toprol XL, Hydralazine and Norvasc as an outpatient  - continued on Toprol XL only for now for afib management; BB up titrated for afib management yesterday; goal BP less than 130/80  - BP better controlled; monitor BP for now and continue to hold Norvasc and Hydralazine; patient reports today home monitoring of BP with parameters for continuation vs holding of Hydralazine and Norvasc

## 2024-01-05 ENCOUNTER — TELEPHONE (OUTPATIENT)
Dept: CARDIOLOGY | Facility: CLINIC | Age: 69
End: 2024-01-05
Payer: MEDICARE

## 2024-01-05 VITALS
BODY MASS INDEX: 18.07 KG/M2 | OXYGEN SATURATION: 98 % | HEART RATE: 109 BPM | DIASTOLIC BLOOD PRESSURE: 56 MMHG | SYSTOLIC BLOOD PRESSURE: 113 MMHG | TEMPERATURE: 98 F | RESPIRATION RATE: 17 BRPM | WEIGHT: 112.44 LBS | HEIGHT: 66 IN

## 2024-01-05 LAB
ANION GAP SERPL CALC-SCNC: 12 MMOL/L (ref 8–16)
BASOPHILS # BLD AUTO: 0.03 K/UL (ref 0–0.2)
BASOPHILS NFR BLD: 0.7 % (ref 0–1.9)
BUN SERPL-MCNC: 33 MG/DL (ref 8–23)
CALCIUM SERPL-MCNC: 9.3 MG/DL (ref 8.7–10.5)
CHLORIDE SERPL-SCNC: 100 MMOL/L (ref 95–110)
CO2 SERPL-SCNC: 27 MMOL/L (ref 23–29)
CREAT SERPL-MCNC: 1.6 MG/DL (ref 0.5–1.4)
DIFFERENTIAL METHOD BLD: ABNORMAL
EOSINOPHIL # BLD AUTO: 0.1 K/UL (ref 0–0.5)
EOSINOPHIL NFR BLD: 2 % (ref 0–8)
ERYTHROCYTE [DISTWIDTH] IN BLOOD BY AUTOMATED COUNT: 16 % (ref 11.5–14.5)
EST. GFR  (NO RACE VARIABLE): 34 ML/MIN/1.73 M^2
GLUCOSE SERPL-MCNC: 121 MG/DL (ref 70–110)
HCT VFR BLD AUTO: 27.7 % (ref 37–48.5)
HGB BLD-MCNC: 8.7 G/DL (ref 12–16)
IMM GRANULOCYTES # BLD AUTO: 0.02 K/UL (ref 0–0.04)
IMM GRANULOCYTES NFR BLD AUTO: 0.4 % (ref 0–0.5)
LYMPHOCYTES # BLD AUTO: 0.9 K/UL (ref 1–4.8)
LYMPHOCYTES NFR BLD: 18.8 % (ref 18–48)
MCH RBC QN AUTO: 28.1 PG (ref 27–31)
MCHC RBC AUTO-ENTMCNC: 31.4 G/DL (ref 32–36)
MCV RBC AUTO: 89 FL (ref 82–98)
MONOCYTES # BLD AUTO: 0.4 K/UL (ref 0.3–1)
MONOCYTES NFR BLD: 8 % (ref 4–15)
NEUTROPHILS # BLD AUTO: 3.2 K/UL (ref 1.8–7.7)
NEUTROPHILS NFR BLD: 70.1 % (ref 38–73)
NRBC BLD-RTO: 0 /100 WBC
PLATELET # BLD AUTO: 183 K/UL (ref 150–450)
PMV BLD AUTO: 10.3 FL (ref 9.2–12.9)
POTASSIUM SERPL-SCNC: 4.1 MMOL/L (ref 3.5–5.1)
RBC # BLD AUTO: 3.1 M/UL (ref 4–5.4)
SODIUM SERPL-SCNC: 139 MMOL/L (ref 136–145)
WBC # BLD AUTO: 4.52 K/UL (ref 3.9–12.7)

## 2024-01-05 PROCEDURE — 99900035 HC TECH TIME PER 15 MIN (STAT)

## 2024-01-05 PROCEDURE — 99233 SBSQ HOSP IP/OBS HIGH 50: CPT | Mod: ,,, | Performed by: NURSE PRACTITIONER

## 2024-01-05 PROCEDURE — 94761 N-INVAS EAR/PLS OXIMETRY MLT: CPT

## 2024-01-05 PROCEDURE — 94640 AIRWAY INHALATION TREATMENT: CPT

## 2024-01-05 PROCEDURE — 27000221 HC OXYGEN, UP TO 24 HOURS

## 2024-01-05 PROCEDURE — 25000003 PHARM REV CODE 250: Performed by: NURSE PRACTITIONER

## 2024-01-05 PROCEDURE — 80048 BASIC METABOLIC PNL TOTAL CA: CPT | Performed by: INTERNAL MEDICINE

## 2024-01-05 PROCEDURE — 36415 COLL VENOUS BLD VENIPUNCTURE: CPT | Performed by: INTERNAL MEDICINE

## 2024-01-05 PROCEDURE — 25000003 PHARM REV CODE 250: Performed by: FAMILY MEDICINE

## 2024-01-05 PROCEDURE — 94799 UNLISTED PULMONARY SVC/PX: CPT | Mod: XB

## 2024-01-05 PROCEDURE — 85025 COMPLETE CBC W/AUTO DIFF WBC: CPT | Performed by: NURSE PRACTITIONER

## 2024-01-05 PROCEDURE — 25000003 PHARM REV CODE 250: Performed by: INTERNAL MEDICINE

## 2024-01-05 RX ORDER — METOPROLOL SUCCINATE 50 MG/1
50 TABLET, EXTENDED RELEASE ORAL 2 TIMES DAILY
Status: DISCONTINUED | OUTPATIENT
Start: 2024-01-05 | End: 2024-01-05 | Stop reason: HOSPADM

## 2024-01-05 RX ORDER — TORSEMIDE 20 MG/1
20 TABLET ORAL 2 TIMES DAILY
Qty: 60 TABLET | Refills: 11 | Status: SHIPPED | OUTPATIENT
Start: 2024-01-05 | End: 2025-01-04

## 2024-01-05 RX ORDER — METOPROLOL SUCCINATE 50 MG/1
50 TABLET, EXTENDED RELEASE ORAL 2 TIMES DAILY
Qty: 60 TABLET | Refills: 11 | Status: SHIPPED | OUTPATIENT
Start: 2024-01-05 | End: 2025-01-04

## 2024-01-05 RX ORDER — HYDRALAZINE HYDROCHLORIDE 100 MG/1
TABLET, FILM COATED ORAL
Qty: 90 TABLET | Refills: 11 | Status: SHIPPED | OUTPATIENT
Start: 2024-01-05

## 2024-01-05 RX ADMIN — APIXABAN 2.5 MG: 2.5 TABLET, FILM COATED ORAL at 10:01

## 2024-01-05 RX ADMIN — TORSEMIDE 20 MG: 20 TABLET ORAL at 10:01

## 2024-01-05 RX ADMIN — ALLOPURINOL 100 MG: 100 TABLET ORAL at 10:01

## 2024-01-05 RX ADMIN — CALCIUM CARBONATE 500 MG (1,250 MG)-VITAMIN D3 200 UNIT TABLET 1 TABLET: at 09:01

## 2024-01-05 RX ADMIN — LEVOTHYROXINE SODIUM 75 MCG: 25 TABLET ORAL at 06:01

## 2024-01-05 RX ADMIN — ATORVASTATIN CALCIUM 40 MG: 40 TABLET, FILM COATED ORAL at 10:01

## 2024-01-05 RX ADMIN — TIOTROPIUM BROMIDE INHALATION SPRAY 2 PUFF: 3.12 SPRAY, METERED RESPIRATORY (INHALATION) at 10:01

## 2024-01-05 RX ADMIN — POLYETHYLENE GLYCOL 3350 17 G: 17 POWDER, FOR SOLUTION ORAL at 10:01

## 2024-01-05 RX ADMIN — DULOXETINE 60 MG: 30 CAPSULE, DELAYED RELEASE ORAL at 10:01

## 2024-01-05 RX ADMIN — FERROUS SULFATE TAB 325 MG (65 MG ELEMENTAL FE) 1 EACH: 325 (65 FE) TAB at 10:01

## 2024-01-05 RX ADMIN — PANTOPRAZOLE SODIUM 40 MG: 40 TABLET, DELAYED RELEASE ORAL at 10:01

## 2024-01-05 RX ADMIN — METOPROLOL SUCCINATE 50 MG: 50 TABLET, EXTENDED RELEASE ORAL at 10:01

## 2024-01-05 RX ADMIN — FLUTICASONE FUROATE AND VILANTEROL TRIFENATATE 1 PUFF: 100; 25 POWDER RESPIRATORY (INHALATION) at 10:01

## 2024-01-05 RX ADMIN — BETHANECHOL CHLORIDE 25 MG: 25 TABLET ORAL at 10:01

## 2024-01-05 NOTE — PROGRESS NOTES
Discharge orders noted. Additional clinical references attached.    Patient's discharge instructions given by bedside RN and reviewed via this VN.  Education provided on new medication, diagnosis, and follow-up appointments.  Teach back method used. Patient verbalized understanding. All questions answered.  Granddaughter to transport home with oxygen. Awaiting family to arrive.   Floor nurse notified.      01/05/24 8384   AVS Confirmation   Discharge instructions and AVS given to and reviewed with patient and/or significant other. Yes

## 2024-01-05 NOTE — ASSESSMENT & PLAN NOTE
- echo with normal LVEF and severe MR  - followed by Dr. Liz at [facility]; patient reports known history and was evaluated by Dr. Liz and told only a candidate for medical management and not a candidate for Mitral clip- reviewed echo with Dr Hoffmann this AM and feel likelihood of invasive evaluation for MitralClip possible  - will continue Demadex BID   - discussed with patient likelihood of recurrent exacerbations despite compliance with medication regimen and dietary restrictions  - will refer to Dr Lora for evaluation of MR

## 2024-01-05 NOTE — PLAN OF CARE
D/c orders noted.     Set up completed for pt to have HH services with [facility].     Pt's granddaughter will transport pt home at the time of d/c. Pt and pt's family are agreeble for pt to discharge home with HH services.    Pt is cleared to go from CM standpoint.     Future Appointments   Date Time Provider Department Center   1/10/2024 10:00 AM Ceci Castelan MD [facility]       01/05/24 1528   Final Note   Assessment Type Final Discharge Note   Anticipated Discharge Disposition Home-Health  ([facility] )   Post-Acute Status   Post-Acute Authorization Home Health   Home Health Status Set-up Complete/Auth obtained   Discharge Delays None known at this time

## 2024-01-05 NOTE — SUBJECTIVE & OBJECTIVE
Review of Systems   Constitutional: Negative for chills, decreased appetite, diaphoresis and fever.   Cardiovascular:  Positive for dyspnea on exertion (improved). Negative for chest pain, claudication, cyanosis, irregular heartbeat, leg swelling, near-syncope, orthopnea, palpitations, paroxysmal nocturnal dyspnea and syncope.   Respiratory:  Negative for cough, hemoptysis, shortness of breath and wheezing.    Gastrointestinal:  Negative for bloating, abdominal pain, constipation, diarrhea, melena, nausea and vomiting.   Neurological:  Negative for dizziness and weakness.     Objective:     Vital Signs (Most Recent):  Temp: 97.9 °F (36.6 °C) (01/05/24 1118)  Pulse: 109 (01/05/24 1118)  Resp: 17 (01/05/24 1118)  BP: (!) 116/59 (01/05/24 1118)  SpO2: 95 % (01/05/24 1118) Vital Signs (24h Range):  Temp:  [97.6 °F (36.4 °C)-98.5 °F (36.9 °C)] 97.9 °F (36.6 °C)  Pulse:  [] 109  Resp:  [17-22] 17  SpO2:  [95 %-99 %] 95 %  BP: (109-132)/(58-77) 116/59     Weight: 51 kg (112 lb 7 oz)  Body mass index is 18.15 kg/m².     SpO2: 95 %         Intake/Output Summary (Last 24 hours) at 1/5/2024 1244  Last data filed at 1/5/2024 1023  Gross per 24 hour   Intake 666 ml   Output 1300 ml   Net -634 ml       Lines/Drains/Airways       Peripheral Intravenous Line  Duration                  Peripheral IV - Single Lumen 01/01/24 1137 Anterior;Left;Proximal Forearm 4 days                       Physical Exam  Constitutional:       General: She is not in acute distress.     Appearance: She is well-developed.   Cardiovascular:      Rate and Rhythm: Tachycardia present. Rhythm irregular.      Heart sounds: No murmur heard.     No gallop.   Pulmonary:      Effort: Pulmonary effort is normal. No respiratory distress.      Breath sounds: Normal breath sounds. No wheezing.   Abdominal:      General: Bowel sounds are normal. There is no distension.      Palpations: Abdomen is soft.      Tenderness: There is no abdominal tenderness.  "  Skin:     General: Skin is warm and dry.   Neurological:      Mental Status: She is alert and oriented to person, place, and time.            Significant Labs: BMP:   Recent Labs   Lab 01/04/24  0330 01/05/24  0253   * 121*   * 139   K 3.8 4.1   CL 99 100   CO2 28 27   BUN 27* 33*   CREATININE 1.5* 1.6*   CALCIUM 9.4 9.3   , CBC   Recent Labs   Lab 01/05/24  0253   WBC 4.52   HGB 8.7*   HCT 27.7*      , and Troponin No results for input(s): "TROPONINI" in the last 48 hours.    Significant Imaging: Echocardiogram: Transthoracic echo (TTE) complete (Cupid Only):   Results for orders placed or performed during the hospital encounter of 01/01/24   Echo   Result Value Ref Range    RA Width 3.22 cm    LA volume (mod) 90.19 cm3    Left Atrium Major Axis 6.74 cm    RA Major Axis 4.99 cm    LV Diastolic Volume 84.69 mL    LV Systolic Volume 41.79 mL    PV Peak D Freddy 0.54 m/s    PV Peak S Freddy 0.41 m/s    MV stenosis pressure 1/2 time 19.24 ms    MV VTI 17.5 cm    TR Max Freddy 3.17 m/s    AR Max Freddy 4.60 m/s    MV Peak E Freddy 1.37 m/s    MV peak gradient 10 mmHg    Mr max freddy 5.57 m/s    Ao VTI 27.10 cm    Ao peak freddy 1.51 m/s    LVOT peak VTI 24.40 cm    LVOT peak freddy 1.27 m/s    LVOT diameter 1.98 cm    E wave deceleration time 66.35 msec    MV mean gradient 6 mmHg    AV mean gradient 6 mmHg    AV regurgitation pressure 1/2 time [id number] ms    RV S' 10.92 cm/s    TAPSE 1.75 cm    RVDD 2.10 cm    LA size 4.37 cm    Ascending aorta 3.21 cm    STJ 3.33 cm    Sinus 3.16 cm    LVIDs 3.23 2.1 - 4.0 cm    Posterior Wall 1.08 0.6 - 1.1 cm    IVS 1.31 (A) 0.6 - 1.1 cm    LVIDd 4.34 3.5 - 6.0 cm    PV PEAK VELOCITY 0.64 m/s    TDI LATERAL 0.06 m/s    Pulmonary Valve Mean Velocity 0.46 m/s    Left Ventricular Outflow Tract Mean Gradient 3.59 mmHg    Left Ventricular Outflow Tract Mean Velocity 0.88 cm/s    Cornell's Biplane MOD Ejection Fraction 69 %    IVC diameter 0.84 cm    TDI SEPTAL 0.05 m/s    LV " LATERAL E/E' RATIO 22.83 m/s    LV SEPTAL E/E' RATIO 27.40 m/s    FS 26 (A) 28 - 44 %    LV mass 186.20 g    ZLVIDD -0.33     ZLVIDS 1.17     Left Ventricle Relative Wall Thickness 0.50 cm    AV valve area 2.77 cm²    AV Velocity Ratio 0.84     AV index (prosthetic) 0.90     MV valve area p 1/2 method 11.43 cm2    MV valve area by continuity eq 4.29 cm2    PV peak gradient 2 mmHg    Mean e' 0.06 m/s    Pulm vein S/D ratio 0.76     LVOT area 3.1 cm2    LVOT stroke volume 75.09 cm3    AV peak gradient 9 mmHg    E/E' ratio 24.91 m/s    LV Systolic Volume Index 27.0 mL/m2    LV Diastolic Volume Index 54.64 mL/m2    LV Mass Index 120 g/m2    Triscuspid Valve Regurgitation Peak Gradient 40 mmHg    LA Volume Index (Mod) 58.2 mL/m2    ABHAY by Velocity Ratio 2.59 cm²    BSA 1.52 m2    LA WIDTH 4.5 cm    TV resting pulmonary artery pressure 43 mmHg    RV TB RVSP 6 mmHg    Est. RA pres 3 mmHg    Narrative      Left Ventricle: The left ventricle is normal in size. There is   concentric hypertrophy. Normal wall motion. There is normal systolic   function. Biplane (2D) method of discs ejection fraction is 69%. Unable to   assess diastolic function due to atrial fibrillation.    Right Ventricle: Normal right ventricular cavity size. Systolic   function is normal. TAPSE is 1.75 cm.    Left Atrium: Left atrium is dilated.    Aortic Valve: There is mild aortic regurgitation.    Mitral Valve: There is severe regurgitation.    Tricuspid Valve: There is mild regurgitation.    Pulmonary Artery: The estimated pulmonary artery systolic pressure is   43 mmHg.    IVC/SVC: Normal venous pressure at 3 mmHg.

## 2024-01-05 NOTE — ASSESSMENT & PLAN NOTE
- SBP 100s-120s  - On Toprol XL, Hydralazine and Norvasc as an outpatient  - continued on Toprol XL only for now for afib management with up titration for afib management yesterday; goal BP less than 130/80  - BP better controlled; will continue to hold Norvasc and Hydralazine as an outpatient for now; will monitor BP at home and instructed to take Hydralazine if BP greater than 140/90; will permanently discontinue Norvasc for now; ideally if creatinine continues to improve would want to start ARB in place on Hydralazine

## 2024-01-05 NOTE — PLAN OF CARE
Problem: Adult Inpatient Plan of Care  Goal: Plan of Care Review  1/5/2024 0637 by Stephenie Gilman RN  Outcome: Ongoing, Not Progressing  1/5/2024 0636 by Stephenie Gilman, RN  Outcome: Ongoing, Progressing   Chart reviewed.

## 2024-01-05 NOTE — PROGRESS NOTES
[place] - Telemetry  Cardiology  Progress Note    Patient Name: Melissa Krishnamurthy  MRN: [id number]  Admission Date: 1/1/2024  Hospital Length of Stay: 3 days  Code Status: Full Code   Attending Physician: Beatriz Resendez MD   Primary Care Physician: Tonio Resendez MD  Expected Discharge Date:   Principal Problem:Acute on chronic diastolic congestive heart failure    Subjective:     Hospital Course:   1/2/2024 per HPI   1/3/2024 Remains in afib with HR up to 120s non sustained. Creatinine up to 1.8 from 1.3 yesterday. On Lasix 20mg BID with 750cc documented out overnight but likely more. Reports feeling better from SOB standpoint. Echo pending   1/4/2024 Creatinine trended down to 1.5. HR improving and currently 97 afib. HR with occasional runs of RVR with rates up to 100s-110s. SBP 100s-120s overnight. Echo with EF 69%, mild AI and severe MR.   1/5/2024 Creatinine 1.6 this AM. HR 80s-90s per Epic. Remains in afib with HR up to 110s non sustained. On Demadex BID with 1.3L out overnight. Feeling better. On O2 at home rate         Review of Systems   Constitutional: Negative for chills, decreased appetite, diaphoresis and fever.   Cardiovascular:  Positive for dyspnea on exertion (improved). Negative for chest pain, claudication, cyanosis, irregular heartbeat, leg swelling, near-syncope, orthopnea, palpitations, paroxysmal nocturnal dyspnea and syncope.   Respiratory:  Negative for cough, hemoptysis, shortness of breath and wheezing.    Gastrointestinal:  Negative for bloating, abdominal pain, constipation, diarrhea, melena, nausea and vomiting.   Neurological:  Negative for dizziness and weakness.     Objective:     Vital Signs (Most Recent):  Temp: 97.9 °F (36.6 °C) (01/05/24 1118)  Pulse: 109 (01/05/24 1118)  Resp: 17 (01/05/24 1118)  BP: (!) 116/59 (01/05/24 1118)  SpO2: 95 % (01/05/24 1118) Vital Signs (24h Range):  Temp:  [97.6 °F (36.4 °C)-98.5 °F (36.9 °C)] 97.9 °F (36.6 °C)  Pulse:  [] 109  Resp:   "[17-22] 17  SpO2:  [95 %-99 %] 95 %  BP: (109-132)/(58-77) 116/59     Weight: 51 kg (112 lb 7 oz)  Body mass index is 18.15 kg/m².     SpO2: 95 %         Intake/Output Summary (Last 24 hours) at 1/5/2024 1244  Last data filed at 1/5/2024 1023  Gross per 24 hour   Intake 666 ml   Output 1300 ml   Net -634 ml       Lines/Drains/Airways       Peripheral Intravenous Line  Duration                  Peripheral IV - Single Lumen 01/01/24 1137 Anterior;Left;Proximal Forearm 4 days                       Physical Exam  Constitutional:       General: She is not in acute distress.     Appearance: She is well-developed.   Cardiovascular:      Rate and Rhythm: Tachycardia present. Rhythm irregular.      Heart sounds: No murmur heard.     No gallop.   Pulmonary:      Effort: Pulmonary effort is normal. No respiratory distress.      Breath sounds: Normal breath sounds. No wheezing.   Abdominal:      General: Bowel sounds are normal. There is no distension.      Palpations: Abdomen is soft.      Tenderness: There is no abdominal tenderness.   Skin:     General: Skin is warm and dry.   Neurological:      Mental Status: She is alert and oriented to person, place, and time.            Significant Labs: BMP:   Recent Labs   Lab 01/04/24  0330 01/05/24  0253   * 121*   * 139   K 3.8 4.1   CL 99 100   CO2 28 27   BUN 27* 33*   CREATININE 1.5* 1.6*   CALCIUM 9.4 9.3   , CBC   Recent Labs   Lab 01/05/24  0253   WBC 4.52   HGB 8.7*   HCT 27.7*      , and Troponin No results for input(s): "TROPONINI" in the last 48 hours.    Significant Imaging: Echocardiogram: Transthoracic echo (TTE) complete (Cupid Only):   Results for orders placed or performed during the hospital encounter of 01/01/24   Echo   Result Value Ref Range    RA Width 3.22 cm    LA volume (mod) 90.19 cm3    Left Atrium Major Axis 6.74 cm    RA Major Axis 4.99 cm    LV Diastolic Volume 84.69 mL    LV Systolic Volume 41.79 mL    PV Peak D Freddy 0.54 m/s    PV " Peak S Freddy 0.41 m/s    MV stenosis pressure 1/2 time 19.24 ms    MV VTI 17.5 cm    TR Max Freddy 3.17 m/s    AR Max Freddy 4.60 m/s    MV Peak E Freddy 1.37 m/s    MV peak gradient 10 mmHg    Mr max freddy 5.57 m/s    Ao VTI 27.10 cm    Ao peak freddy 1.51 m/s    LVOT peak VTI 24.40 cm    LVOT peak freddy 1.27 m/s    LVOT diameter 1.98 cm    E wave deceleration time 66.35 msec    MV mean gradient 6 mmHg    AV mean gradient 6 mmHg    AV regurgitation pressure 1/2 time [id number] ms    RV S' 10.92 cm/s    TAPSE 1.75 cm    RVDD 2.10 cm    LA size 4.37 cm    Ascending aorta 3.21 cm    STJ 3.33 cm    Sinus 3.16 cm    LVIDs 3.23 2.1 - 4.0 cm    Posterior Wall 1.08 0.6 - 1.1 cm    IVS 1.31 (A) 0.6 - 1.1 cm    LVIDd 4.34 3.5 - 6.0 cm    PV PEAK VELOCITY 0.64 m/s    TDI LATERAL 0.06 m/s    Pulmonary Valve Mean Velocity 0.46 m/s    Left Ventricular Outflow Tract Mean Gradient 3.59 mmHg    Left Ventricular Outflow Tract Mean Velocity 0.88 cm/s    [employer] Biplane MOD Ejection Fraction 69 %    IVC diameter 0.84 cm    TDI SEPTAL 0.05 m/s    LV LATERAL E/E' RATIO 22.83 m/s    LV SEPTAL E/E' RATIO 27.40 m/s    FS 26 (A) 28 - 44 %    LV mass 186.20 g    ZLVIDD -0.33     ZLVIDS 1.17     Left Ventricle Relative Wall Thickness 0.50 cm    AV valve area 2.77 cm²    AV Velocity Ratio 0.84     AV index (prosthetic) 0.90     MV valve area p 1/2 method 11.43 cm2    MV valve area by continuity eq 4.29 cm2    PV peak gradient 2 mmHg    Mean e' 0.06 m/s    Pulm vein S/D ratio 0.76     LVOT area 3.1 cm2    LVOT stroke volume 75.09 cm3    AV peak gradient 9 mmHg    E/E' ratio 24.91 m/s    LV Systolic Volume Index 27.0 mL/m2    LV Diastolic Volume Index 54.64 mL/m2    LV Mass Index 120 g/m2    Triscuspid Valve Regurgitation Peak Gradient 40 mmHg    LA Volume Index (Mod) 58.2 mL/m2    ABHAY by Velocity Ratio 2.59 cm²    BSA 1.52 m2    LA WIDTH 4.5 cm    TV resting pulmonary artery pressure 43 mmHg    RV TB RVSP 6 mmHg    Est. RA pres 3 mmHg    Narrative       Left Ventricle: The left ventricle is normal in size. There is   concentric hypertrophy. Normal wall motion. There is normal systolic   function. Biplane (2D) method of discs ejection fraction is 69%. Unable to   assess diastolic function due to atrial fibrillation.    Right Ventricle: Normal right ventricular cavity size. Systolic   function is normal. TAPSE is 1.75 cm.    Left Atrium: Left atrium is dilated.    Aortic Valve: There is mild aortic regurgitation.    Mitral Valve: There is severe regurgitation.    Tricuspid Valve: There is mild regurgitation.    Pulmonary Artery: The estimated pulmonary artery systolic pressure is   43 mmHg.    IVC/SVC: Normal venous pressure at 3 mmHg.       Assessment and Plan:     Brief HPI: Seen on AM rounds while resting in bed while eating breakfast. Reports SOB improved. Discussed POC as detailed below-verbalized understanding and agrees with POC      * Acute on chronic diastolic congestive heart failure  - echo with EF 55% 11/2023; repeat echo  with normal LVEF and severe MR  - ; CXR reviewed with layering pleural effusion- initially given Lasix 20mg IV BID with good response; placed on Demadex   - SOB improved; reports use of Bumex as an outpatient with decreased response; feel Demadex better choice over Lasix given history of abdominal swelling/gut edema   - eventhough EF normal; severe MR compounds output and high risk for acute volume overload therefore feel oral diuretics needed for outpatient maintenance   - strict I&Os and daily weights  - monitor closely during diuresis   - [place] [facility] [place] as an outpatient     Nonrheumatic mitral valve regurgitation  - echo with normal LVEF and severe MR  - [place] Liz [place] [facility] [place]; patient reports known history and was evaluated by Dr. Liz and told only a candidate for medical management and not a candidate for Mitral clip- reviewed echo with Dr Hoffmann this AM and feel likelihood of invasive  evaluation for MitralClip possible  - will continue Demadex BID   - discussed with patient likelihood of recurrent exacerbations despite compliance with medication regimen and dietary restrictions  - will refer to Dr Lora for evaluation of MR     Anemia  - H&H 8.7&27.2 upon admission; no significant change admission   - baseline Hgb 8-9; reports long history of anemia treated with iron infusions in the past      Atrial fibrillation with RVR  - presented with SOB with afib with RVR with HR up to 130s  - remains in afib with HR improving and currently in the 90s; brief periods of RVR with HR up to 110s non sustained   - on Toprol XL 25mg po daily as an outpatient with up titration while admitted; will further up titrate today to Toprol XL 50mg BID    - continue Eliquis; CHADSVAS score 3 (CHF, HTN, age); recommend continuation of AC for stroke prevention     Elevated troponin  - troponin elevated at .[phone]  - presented with SOB and no chest pain  - elevation related to demand etiology and no concern for ACS currently  - EKG with no acute changes; echo as detailed previously     Acute on chronic hypoxic respiratory failure  - history of COPD on home O2  - presented with SOB with concern for volume overload as well as pulmonary etiology   - management as detailed previously     Hypertension  - SBP 100s-120s  - On Toprol XL, Hydralazine and Norvasc as an outpatient  - continued on Toprol XL only for now for afib management with up titration for afib management yesterday; goal BP less than 130/80  - BP better controlled; will continue to hold Norvasc and Hydralazine as an outpatient for now; will monitor BP at home and instructed to take Hydralazine if BP greater than 140/90; will permanently discontinue Norvasc for now; ideally if creatinine continues to improve would want to start ARB in place on Hydralazine          VTE Risk Mitigation (From admission, onward)           Ordered     apixaban tablet 2.5 mg   2 times daily         01/02/24 1624     IP VTE HIGH RISK PATIENT  Once         01/01/24 1609                    PILO Garrett, ANP  Cardiology  [place] [facility]

## 2024-01-05 NOTE — ASSESSMENT & PLAN NOTE
- echo with EF 55% 11/2023; repeat echo  with normal LVEF and severe MR  - ; CXR reviewed with layering pleural effusion- initially given Lasix 20mg IV BID with good response; placed on Demadex   - SOB improved; reports use of Bumex as an outpatient with decreased response; feel Demadex better choice over Lasix given history of abdominal swelling/gut edema   - eventhough EF normal; severe MR compounds output and high risk for acute volume overload therefore feel oral diuretics needed for outpatient maintenance   - strict I&Os and daily weights  - monitor closely during diuresis   - [place] up with [place] Liz [place] [facility] [place] as an outpatient

## 2024-01-05 NOTE — ASSESSMENT & PLAN NOTE
- presented with SOB with afib with RVR with HR up to 130s  - remains in afib with HR improving and currently in the 90s; brief periods of RVR with HR up to 110s non sustained   - on Toprol XL 25mg po daily as an outpatient with up titration while admitted; will further up titrate today to Toprol XL 50mg BID    - continue Eliquis; CHADSVAS score 3 (CHF, HTN, age); recommend continuation of AC for stroke prevention

## 2024-01-05 NOTE — ASSESSMENT & PLAN NOTE
- H&H 8.7&27.2 upon admission; no significant change admission   - baseline Hgb 8-9; reports long history of anemia treated with iron infusions in the past

## 2024-01-05 NOTE — PLAN OF CARE
Problem: Adult Inpatient Plan of Care  Goal: Plan of Care Review  Outcome: Ongoing, Progressing     Problem: COPD (Chronic Obstructive Pulmonary Disease) Comorbidity  Goal: Maintenance of COPD Symptom Control  Outcome: Ongoing, Progressing     Problem: Dysrhythmia  Goal: Normalized Cardiac Rhythm  Outcome: Ongoing, Progressing     Problem: Fall Injury Risk  Goal: Absence of Fall and Fall-Related Injury  Outcome: Ongoing, Progressing

## 2024-01-06 NOTE — TELEPHONE ENCOUNTER
----- Message from PILO Nava, ANP sent at 1/5/2024  1:13 PM CST -----  Referral to Dr. Lora for Mitral Clip evaluation. Sent a message to his office not certain if I sent it to the right person    BH

## 2024-01-07 NOTE — DISCHARGE SUMMARY
[facility] Medicine  Discharge Summary      Patient Name: Melissa Krishnamurthy  MRN: [id number]  TARIK: [id number]  Patient Class: IP- Inpatient  Admission Date: 1/1/2024  Hospital Length of Stay: 3 days  Discharge Date and Time: 1/5/2024  6:59 PM  Attending Physician: No att. providers found   Discharging Provider: Beatriz Resendez MD  Primary Care Provider: Tonio Resendez MD    Primary Care Team: Networked reference to record PCT     HPI:    This is a 69-year-old  female who comes in with complaints of progressively worsening shortness in bed for the past 2 weeks.  Patient normally on 3 L nasal cannula at home for history of COPD.  She was initially placed on BiPAP in the ER and found to have crackles in lung.  BNP was slightly elevated.  Patient given Lasix 80 mg IV once with diuresis and improvement in respiratory status.  Patient weaned off of BiPAP and now currently on 3 L nasal cannula.  Patient denies chest pain, fever chills, nausea vomiting or abdominal pain, dysuria hematuria, blood in stools black stools, loss of consciousness or seizures.  Patient is awake alert and oriented x3 and in no acute distress with no focal neuro deficits.  States last bowel movement was 2 days ago which is normal for her.  Patient states she lives at home with  and 2 adopted children.  She states she feels much better since coming to the hospital.  She follows all commands.  She also states she took her Bumex earlier this morning as well.  Patient currently admitted to hospitalist services.    * No surgery found *      Hospital Course:   1/2: diuresed 700 cc overnight with sob improved. Cards consulted and will continue to monitor. troponin elevated at .[id number] , elevation more related to demand etiology and no concern for ACS per cards. currently Cont home eliquis  1/3: pt denies any sob or cp. Creat from 1.3 to 1.8. Cards holding lasix  1/4: creat now at 1.5. cards starting  demadex  1/5: creat stable around 1.6. Remains in afib with HR up to 110s non sustained. On Demadex BID with 1.3L out overnight. Feeling better. On O2 at home rate    Cards consult:- echo with normal LVEF and severe MR  - [place] [facility] [place]; patient reports known history and was evaluated by Dr. Liz and told only a candidate for medical management and not a candidate for Mitral clip- reviewed echo with Dr Hoffmann this AM and feel likelihood of invasive evaluation for MitralClip possible  - will continue Demadex BID   - discussed with patient likelihood of recurrent exacerbations despite compliance with medication regimen and dietary restrictions  - will refer to Dr Lora for evaluation of MR      Goals of Care Treatment Preferences:  Code Status: Full Code      Consults:   Consults (From admission, onward)          Status Ordering Provider     Inpatient consult to Cardiology-[facility]  Once        Provider:  (Not yet assigned)    Completed DARREL KELLY     Inpatient consult to Social Work/Case Management  Once        Provider:  (Not yet assigned)    Completed TUAN MELISSA     Inpatient consult to Registered Dietitian/Nutritionist  Once        Provider:  (Not yet assigned)    Completed TUAN MELISSA            Pulmonary  COPD (chronic obstructive pulmonary disease)   - no exacerbation   -resume home meds   -nebs p.r.n.    Cardiac/Vascular  * Acute on chronic diastolic congestive heart failure   -previous echo on 11/16/2023:  The echocardiographic study is compatible with normal left ventricular   size and normal systolic function.   Echo-Doppler Study: Aortic Outflow peak velocity of 1.7m/s.   There is no aortic stenosis. There is mild aortic regurgitation. EF = >55%   There is no evidence of mitral stenosis. There is mild tricuspid   regurgitation observed by color doppler or spectral analysis. E/A = 1.8.   There is mitral regurgitation of 4+ severity.   Mitral Valve Prolapse.    Pulmonary insufficiency (mild).   PAP= 23mmHg.    Aortic sclerosis without stenosis.   Mild Left Atrial Enlargement.   Concentric Left Ventricular hypertrophy.       Recent Labs   Lab 01/01/24  1109   *       Cards starting demadex and will continue as outpt    Nonrheumatic mitral valve regurgitation        Atrial fibrillation with RVR  Patient with Persistent (7 days or more) atrial fibrillation which is controlled currently with Beta Blocker. Patient is currently in atrial fibrillation.[id number] Score: 2. Anticoagulation indicated. Anticoagulation done with eliquis .    - remains in afib with HR improving and currently in the 90s; brief periods of RVR with HR up to 110s non sustained   - on Toprol XL 25mg po daily as an outpatient with up titration while admitted; will further up titrate today to Toprol XL 50mg BID    - continue Eliquis; CHADSVAS score 3 (CHF, HTN, age); recommend continuation of AC for stroke preventio    Elevated troponin   -likely due to demand ischemia   - - troponin elevated at .[id number]  - presented with SOB and no chest pain  - elevation related to demand etiology and no concern for ACS currently  - EKG with no acute changes; echo as detailed previously       Mitral valve prolapse   -per echo on 11/16/2023 at [facility] [place]:  The echocardiographic study is compatible with normal left ventricular   size and normal systolic function.   Echo-Doppler Study: Aortic Outflow peak velocity of 1.7m/s.   There is no aortic stenosis. There is mild aortic regurgitation. EF = >55%   There is no evidence of mitral stenosis. There is mild tricuspid   regurgitation observed by color doppler or spectral analysis. E/A = 1.8.   There is mitral regurgitation of 4+ severity.   Mitral Valve Prolapse.   Pulmonary insufficiency (mild).   PAP= 23mmHg.    Aortic sclerosis without stenosis.   Mild Left Atrial Enlargement.   Concentric Left Ventricular hypertrophy.   Cards:  - echo with normal LVEF and  severe MR  - followed by Dr. Liz at [facility] [place]; patient reports known history and was evaluated by Dr. Liz and told only a candidate for medical management and not a candidate for Mitral clip- reviewed echo with Dr Hoffmann this AM and feel likelihood of invasive evaluation for MitralClip possible  - will continue Demadex BID   - discussed with patient likelihood of recurrent exacerbations despite compliance with medication regimen and dietary restrictions  - will refer to Dr Lora for evaluation of MR      -defer to Cardiology    Hypertension  Chronic, controlled. Latest blood pressure and vitals reviewed-      .   Home meds for hypertension were reviewed and noted below.   Hypertension Medications               amLODIPine (NORVASC) 5 MG tablet Take 5 mg by mouth 2 (two) times daily.    bumetanide (BUMEX) 2 MG tablet Take 2 mg by mouth once daily.    hydrALAZINE (APRESOLINE) 100 MG tablet Take 100 mg by mouth 3 (three) times daily.    metoprolol succinate (TOPROL-XL) 25 MG 24 hr tablet Take 25 mg by mouth once daily.             Adjust BP medications as needed    Will utilize p.r.n. blood pressure medication only if patient's blood pressure greater than 160/100 and she develops symptoms such as worsening chest pain or shortness of breath.    Other  Acute on chronic hypoxic respiratory failure   -O2 sats currently low to mid 90s on 3 L nasal cannula (this is her baseline)   - patient temporarily required BiPAP in ER on admission  -BiPAP p.r.n.   -incentive spirometry q.2 hours while awake      Anxiety   -resume home medications        Final Active Diagnoses:    Diagnosis Date Noted POA    PRINCIPAL PROBLEM:  Acute on chronic diastolic congestive heart failure [I50.33] 01/01/2024 Yes    Nonrheumatic mitral valve regurgitation [I34.0] 01/04/2024 Yes    Atrial fibrillation with RVR [I48.91] 01/02/2024 Yes    Anemia [D64.9] 01/02/2024 Yes    Acute on chronic hypoxic respiratory failure [J96.21] 01/01/2024 Yes    Mitral  "valve prolapse [I34.1] 01/01/2024 Yes    Elevated troponin [R79.89] 01/01/2024 Yes    COPD (chronic obstructive pulmonary disease) [J44.9] 03/27/2014 Yes    Hypertension [I10] 03/27/2014 Yes    Anxiety [F41.9] 03/27/2014 Yes      Problems Resolved During this Admission:       Discharged Condition: stable    Disposition: Home-Health Care c    Follow Up:    Patient Instructions:      ACCEPT - Ambulatory referral/consult to Cardiac Electrophysiology   Standing Status: Future   Referral Priority: Routine Referral Type: Consultation   Referral Reason: Specialty Services Required   Requested Specialty: Cardiology   Number of Visits Requested: 1     Diet Adult Regular     Notify your health care provider if you experience any of the following:  temperature >100.4     Notify your health care provider if you experience any of the following:  persistent nausea and vomiting or diarrhea     Notify your health care provider if you experience any of the following:  severe uncontrolled pain     Activity as tolerated       Significant Diagnostic Studies: Labs: BMP: No results for input(s): "GLU", "NA", "K", "CL", "CO2", "BUN", "CREATININE", "CALCIUM", "MG" in the last 48 hours., Lipid Panel No results found for: "CHOL", "HDL", "LDLCALC", "TRIG", "CHOLHDL", and Troponin   Recent Labs   Lab 01/01/24  1713 01/01/24  2223 01/02/24  0430   TROPONINI 0.036* 0.026 0.035*       Pending Diagnostic Studies:       None           Medications:  Reconciled Home Medications:      Medication List        START taking these medications      ELIQUIS 2.5 mg Tab  Generic drug: apixaban  Take 1 tablet (2.5 mg total) by mouth 2 (two) times daily.     torsemide 20 MG Tab  Commonly known as: DEMADEX  Take 1 tablet (20 mg total) by mouth 2 (two) times a day.            CHANGE how you take these medications      hydrALAZINE 100 MG tablet  Commonly known as: APRESOLINE  Take 1 tablet (100 mg total) by mouth two times per day as needed if BP greater than " 140/90  What changed:   how much to take  how to take this  when to take this  additional instructions     metoprolol succinate 50 MG 24 hr tablet  Commonly known as: TOPROL-XL  Take 1 tablet (50 mg total) by mouth 2 (two) times daily.  What changed:   medication strength  how much to take  when to take this            CONTINUE taking these medications      * albuterol 2.5 mg /3 mL (0.083 %) nebulizer solution  Commonly known as: PROVENTIL  Take 3 mLs (2.5 mg total) by nebulization every 6 (six) hours as needed for Wheezing or Shortness of Breath.     * albuterol 90 mcg/actuation inhaler  Commonly known as: PROVENTIL/VENTOLIN HFA  Inhale 2 puffs into the lungs every 2 (two) hours as needed for Wheezing or Shortness of Breath.     allopurinoL 100 MG tablet  Commonly known as: ZYLOPRIM  Take 100 mg by mouth once daily.     ALPRAZolam 0.5 MG tablet  Commonly known as: XANAX  Take 0.5 mg by mouth 4 (four) times daily as needed.     bethanechol 25 MG Tab  Commonly known as: URECHOLINE  Take 25 mg by mouth 2 (two) times daily.     BREZTRI AEROSPHERE 160-9-4.8 mcg/actuation Hfaa  Generic drug: budesonide-glycopyr-formoterol  Inhale 2 puffs into the lungs 2 (two) times daily.     calcium-vitamin D3 500 mg-5 mcg (200 unit) per tablet  Commonly known as: OS- + D3  Take 1 tablet by mouth 2 (two) times daily with meals.     clonazePAM 0.5 MG tablet  Commonly known as: KlonoPIN  Take 0.5 mg by mouth 2 (two) times daily.     DULoxetine 60 MG capsule  Commonly known as: CYMBALTA  Take 60 mg by mouth 2 (two) times daily.     ferrous sulfate 325 mg (65 mg iron) Tab tablet  Commonly known as: FEOSOL  Take 325 mg by mouth 2 (two) times daily.     guaiFENesin 400 mg Tab  Commonly known as: HUMIBID E  Take 400 mg by mouth.     levothyroxine 75 MCG tablet  Commonly known as: SYNTHROID  Take 75 mcg by mouth every morning.     liothyronine 5 MCG Tab  Commonly known as: CYTOMEL  Take 5 mcg by mouth every morning.     omega-3 fatty  acids 1,000 mg Cap  Take 2 capsules by mouth once daily.     omeprazole 20 MG capsule  Commonly known as: PRILOSEC  Take 20 mg by mouth 2 (two) times daily.     polyethylene glycol 17 gram Pwpk  Commonly known as: GLYCOLAX  Take 1 packet by mouth once daily.     potassium chloride SA 20 MEQ tablet  Commonly known as: K-DUR,KLOR-CON  Take 20 mEq by mouth once daily.     rosuvastatin 10 MG tablet  Commonly known as: CRESTOR  Take 10 mg by mouth once daily.     senna 8.6 mg tablet  Commonly known as: SENOKOT  Take 1 tablet by mouth every evening.     SYMBICORT 160-4.5 mcg/actuation Hfaa  Generic drug: budesonide-formoterol 160-4.5 mcg  Inhale 2 puffs into the lungs 2 (two) times daily.     traMADoL 50 mg tablet  Commonly known as: ULTRAM  Take 50 mg by mouth every 6 (six) hours as needed.           * This list has 2 medication(s) that are the same as other medications prescribed for you. Read the directions carefully, and ask your doctor or other care provider to review them with you.                STOP taking these medications      amLODIPine 5 MG tablet  Commonly known as: NORVASC     bumetanide 2 MG tablet  Commonly known as: BUMEX     ipratropium-albuteroL  mcg/actuation inhaler  Commonly known as: CombiVENT RESPIMAT              Indwelling Lines/Drains at time of discharge:   Lines/Drains/Airways       None                   Time spent on the discharge of patient: >30 minutes         Beatriz Resendez MD  Department of Hospital Medicine  [facility]

## 2024-01-07 NOTE — ASSESSMENT & PLAN NOTE
Chronic, controlled. Latest blood pressure and vitals reviewed-      .   Home meds for hypertension were reviewed and noted below.   Hypertension Medications               amLODIPine (NORVASC) 5 MG tablet Take 5 mg by mouth 2 (two) times daily.    bumetanide (BUMEX) 2 MG tablet Take 2 mg by mouth once daily.    hydrALAZINE (APRESOLINE) 100 MG tablet Take 100 mg by mouth 3 (three) times daily.    metoprolol succinate (TOPROL-XL) 25 MG 24 hr tablet Take 25 mg by mouth once daily.             Adjust BP medications as needed    Will utilize p.r.n. blood pressure medication only if patient's blood pressure greater than 160/100 and she develops symptoms such as worsening chest pain or shortness of breath.

## 2024-01-07 NOTE — ASSESSMENT & PLAN NOTE
-likely due to demand ischemia   - - troponin elevated at .[id number]  - presented with SOB and no chest pain  - elevation related to demand etiology and no concern for ACS currently  - EKG with no acute changes; echo as detailed previously

## 2024-01-07 NOTE — ASSESSMENT & PLAN NOTE
Patient with Persistent (7 days or more) atrial fibrillation which is controlled currently with Beta Blocker. Patient is currently in atrial fibrillation.[id number] Score: 2. Anticoagulation indicated. Anticoagulation done with eliquis .    - remains in afib with HR improving and currently in the 90s; brief periods of RVR with HR up to 110s non sustained   - on Toprol XL 25mg po daily as an outpatient with up titration while admitted; will further up titrate today to Toprol XL 50mg BID    - continue Eliquis; CHADSVAS score 3 (CHF, HTN, age); recommend continuation of AC for stroke preventio

## 2024-01-07 NOTE — ASSESSMENT & PLAN NOTE
-previous echo on 11/16/2023:  The echocardiographic study is compatible with normal left ventricular   size and normal systolic function.   Echo-Doppler Study: Aortic Outflow peak velocity of 1.7m/s.   There is no aortic stenosis. There is mild aortic regurgitation. EF = >55%   There is no evidence of mitral stenosis. There is mild tricuspid   regurgitation observed by color doppler or spectral analysis. E/A = 1.8.   There is mitral regurgitation of 4+ severity.   Mitral Valve Prolapse.   Pulmonary insufficiency (mild).   PAP= 23mmHg.    Aortic sclerosis without stenosis.   Mild Left Atrial Enlargement.   Concentric Left Ventricular hypertrophy.       Recent Labs   Lab 01/01/24  1109   *       Cards starting demadex and will continue as outpt

## 2024-01-08 DIAGNOSIS — I34.0 MITRAL VALVE INSUFFICIENCY, UNSPECIFIED ETIOLOGY: Primary | ICD-10-CM

## 2024-02-27 ENCOUNTER — TELEPHONE (OUTPATIENT)
Dept: CARDIOTHORACIC SURGERY | Facility: CLINIC | Age: 69
End: 2024-02-27
Payer: MEDICARE

## 2024-02-27 NOTE — TELEPHONE ENCOUNTER
Called pt and left Vm to give a call back to reschedule apt with Dr. Orellana what she missed today.  Contact no was provided.

## 2024-03-12 ENCOUNTER — RESEARCH ENCOUNTER (OUTPATIENT)
Dept: RESEARCH | Facility: HOSPITAL | Age: 69
End: 2024-03-12
Payer: MEDICARE

## 2024-03-12 NOTE — PROGRESS NOTES
3-12-24: Attempt to call pt 3 times on this day. Each time I called the line was busy- unable to speak to pt or leave a message RE: Rearrange appt with  for eval RE:MVr. (Regarding PRIMARY trial)    Ayesha Fatima RN, CCM, CRC

## 2024-04-01 PROBLEM — J96.21 ACUTE ON CHRONIC HYPOXIC RESPIRATORY FAILURE: Status: RESOLVED | Noted: 2024-01-01 | Resolved: 2024-04-01

## 2025-05-29 ENCOUNTER — TELEPHONE (OUTPATIENT)
Dept: PHARMACY | Facility: CLINIC | Age: 70
End: 2025-05-29
Payer: MEDICARE

## 2025-05-29 NOTE — TELEPHONE ENCOUNTER
[facility]Population Health Chart Review & Patient Outreach Details For Medication Adherence Project    Reason for Outreach Encounter: 3rd Party payor non-compliance report ([employer], [employer], [employer], etc)  2.  Patient Outreach Method: Reviewed patient chart   3.   Medication in question:             jardiance  last filled  5/1 for 90 day supply      4.  Reviewed and or Updates Made To: Patient Chart  5. Outreach Outcomes and/or actions taken: Patient filled medication and is on track to be adherent  Additional Notes: